# Patient Record
Sex: MALE | Race: WHITE | NOT HISPANIC OR LATINO | Employment: OTHER | URBAN - METROPOLITAN AREA
[De-identification: names, ages, dates, MRNs, and addresses within clinical notes are randomized per-mention and may not be internally consistent; named-entity substitution may affect disease eponyms.]

---

## 2017-07-18 ENCOUNTER — ALLSCRIPTS OFFICE VISIT (OUTPATIENT)
Dept: OTHER | Facility: OTHER | Age: 77
End: 2017-07-18

## 2017-07-18 LAB
BILIRUB UR QL STRIP: NORMAL
CLARITY UR: NORMAL
COLOR UR: YELLOW
GLUCOSE (HISTORICAL): NORMAL
HGB UR QL STRIP.AUTO: NORMAL
KETONES UR STRIP-MCNC: NORMAL MG/DL
LEUKOCYTE ESTERASE UR QL STRIP: NORMAL
NITRITE UR QL STRIP: NORMAL
PH UR STRIP.AUTO: 7 [PH]
PROT UR STRIP-MCNC: NORMAL MG/DL
SP GR UR STRIP.AUTO: 1.01
UROBILINOGEN UR QL STRIP.AUTO: 0.2

## 2017-07-24 ENCOUNTER — GENERIC CONVERSION - ENCOUNTER (OUTPATIENT)
Dept: OTHER | Facility: OTHER | Age: 77
End: 2017-07-24

## 2017-08-10 ENCOUNTER — GENERIC CONVERSION - ENCOUNTER (OUTPATIENT)
Dept: OTHER | Facility: OTHER | Age: 77
End: 2017-08-10

## 2017-08-18 ENCOUNTER — GENERIC CONVERSION - ENCOUNTER (OUTPATIENT)
Dept: OTHER | Facility: OTHER | Age: 77
End: 2017-08-18

## 2018-01-13 VITALS
DIASTOLIC BLOOD PRESSURE: 82 MMHG | WEIGHT: 220 LBS | HEIGHT: 73 IN | SYSTOLIC BLOOD PRESSURE: 132 MMHG | BODY MASS INDEX: 29.16 KG/M2

## 2018-01-16 ENCOUNTER — GENERIC CONVERSION - ENCOUNTER (OUTPATIENT)
Dept: OTHER | Facility: OTHER | Age: 78
End: 2018-01-16

## 2018-01-17 NOTE — MISCELLANEOUS
Message  Spoke with the patient's wife regarding a medication change for overactive bladder  Patient currently taking Oxybutynin 5mg 1 tablet PO TID  Wife states her  is experiencing extremely dry mouth and, in general, feels the medication is ineffective in resolving his OAB symptoms  Dr Pauly Jackson unavailable to discuss the matter, so Dr Izabella Portillo was consulted  His recommendation was Detrol LA (Trospium ER) 4mg 1 tablet PO QD #30 with 1 refill  Patient is to call us back in 4 weeks and let us know how he's doing  If this is unsuccessful, patient will likely be transitioned over to [facility]  Prescribing directions and possible side-effects discussed with Mrs Guille Zarate; she verbalized understanding  Patient called back stating new drug cost approx    $200 per month  This is cost prohibitive to the patient  (Branded Detrol LA is approx  $600 per month)  Hill Harley at [facility]/pharmacy in [address] [place] [address]  stated that the claim is processing all anticholinergics, except Oxybutynin, as a NON-COVERED drug  I advised the patient to call his pharmacy plan to check formulary and benefits for this category of drug  I await his return phone call  1        1 Amended By: Nicole James; Aug 10 2017 3:33 PM EST    Active Problems   1  Overactive bladder (446 51) (N32 81)    Current Meds  1  Oxybutynin Chloride 5 MG Oral Tablet; TAKE 1 TABLET 3 times daily;    Therapy: [id number] to (Evaluate:[id number])  Requested for: 45VLC6584; Last   Rx:[id number] Ordered    Signatures   Electronically signed by : Karen Campos, ; Aug 10 2017  3:37PM EST                       (Author)

## 2018-01-18 NOTE — MISCELLANEOUS
Message   Recorded as Task   Date: 07/24/2017 09:18 AM, Created By: Jason Trevino   Task Name: Medical Complaint Callback   Assigned To: Felipe JOYNER,TEAM   Regarding Patient: Elvira Lima, Status: Active   Comment:    Helga Gamino - 24 Jul 2017 9:18 AM     TASK CREATED  Caller: Self; Medical Complaint; [phone] (Home)  Pt forgot to inform Hubert Rueda he has hernia above bladder at last appointment, he is having discomfort looking for recommendation  Xiao Calixto - 24 Jul 2017 9:24 AM     TASK EDITED  Pt  states he has hernia located groin area  Informed pt he needs to see General Surgeon  May have PCP recommend or Dr Arabella Hogue  Will call PCP  Active Problems    1  Overactive bladder (596 51) (N32 81)    Current Meds   1  Oxybutynin Chloride 5 MG Oral Tablet; TAKE 1 TABLET 3 times daily;    Therapy: [id number] to (Evaluate:[id number])  Requested for: 19RMJ5333; Last   Rx:[id number] Ordered    Signatures   Electronically signed by : Luther Garvin RN; Jul 24 2017  9:24AM EST                       (Author)

## 2018-01-23 NOTE — MISCELLANEOUS
Message   Recorded as Task   Date: 01/16/2018 11:38 AM, Created By: Meredith Covarrubias   Task Name: Care Coordination   Assigned To: Felipe JOYNER,TEAM   Regarding Patient: Vane Blue, Status: Active   Comment:    Meredith Satish - 16 Jan 2018 11:38 AM     TASK CREATED  Spoke with patient's wife, they would like a call back from one of the nurses  She stated that patient was in [facility] ER and they need a follow up appointment  I did advise them we needed records, but they would like a call back from the nurse  [phone] thank you   Carolyn Winkler - 16 Jan 2018 12:07 PM     TASK EDITED  SPOKE WITH WIFE, PT  WAS IN THE ER FOR SWELLING OF THE BRAIN, AND WAS TAKEN OFF OXYBUTYNIN AND PUT ON MYRBETRIQ 25MG  SHE THOUGHT SHE WOULD HAVE TO MAKE AN APPT  BECAUSE OF THAT, BUT SHE DOESN'T  I'LL LET DR MARINO KNOW OF THE MEDICATION CHANGE  Active Problems    1  Elevated PSA (790 93) (R97 20)   2  Overactive bladder (596 51) (N32 81)    Current Meds   1  Oxybutynin Chloride 5 MG Oral Tablet; TAKE 1 TABLET 3 times daily; Therapy: [id number] to (Nelta Sleet)  Requested for: 40NZY2712; Last   Rx:[id number] Ordered   2  Tolterodine Tartrate ER 4 MG Oral Capsule Extended Release 24 Hour; take 1 capsule   daily; Therapy: 89UIP3943 to (Evaluate:75Axx1337)  Requested for: 74Mqe7725; Last   Rx:[id number] Ordered    Allergies    1   Statins    Signatures   Electronically signed by : Marina Kuo, ; Jan 16 2018 12:08PM EST                       (Author)

## 2018-04-02 ENCOUNTER — TELEPHONE (OUTPATIENT)
Dept: UROLOGY | Facility: AMBULATORY SURGERY CENTER | Age: 78
End: 2018-04-02

## 2018-04-02 NOTE — TELEPHONE ENCOUNTER
Pt hospitalized in Jan [facility] for "swollen brain"  At that time placed on Myrbetriq 25 mg  Worked somewhat, still c/o nocturia  Appt 4/18 with Dr Roxanne Mcclellan

## 2018-04-18 ENCOUNTER — OFFICE VISIT (OUTPATIENT)
Dept: UROLOGY | Facility: MEDICAL CENTER | Age: 78
End: 2018-04-18
Payer: COMMERCIAL

## 2018-04-18 VITALS
SYSTOLIC BLOOD PRESSURE: 112 MMHG | HEIGHT: 73 IN | BODY MASS INDEX: 32.23 KG/M2 | WEIGHT: 243.2 LBS | DIASTOLIC BLOOD PRESSURE: 76 MMHG

## 2018-04-18 DIAGNOSIS — N40.0 BPH WITHOUT URINARY OBSTRUCTION: ICD-10-CM

## 2018-04-18 DIAGNOSIS — N32.81 OVERACTIVE BLADDER: Primary | ICD-10-CM

## 2018-04-18 DIAGNOSIS — Z71.89 OTHER SPECIFIED COUNSELING: ICD-10-CM

## 2018-04-18 LAB
SL AMB  POCT GLUCOSE, UA: NEGATIVE
SL AMB LEUKOCYTE ESTERASE,UA: NEGATIVE
SL AMB POCT BILIRUBIN,UA: NEGATIVE
SL AMB POCT BLOOD,UA: NEGATIVE
SL AMB POCT CLARITY,UA: CLEAR
SL AMB POCT COLOR,UA: YELLOW
SL AMB POCT KETONES,UA: NORMAL
SL AMB POCT NITRITE,UA: NEGATIVE
SL AMB POCT PH,UA: 7
SL AMB POCT SPECIFIC GRAVITY,UA: 1.01
SL AMB POCT URINE PROTEIN: NORMAL
SL AMB POCT UROBILINOGEN: 1

## 2018-04-18 PROCEDURE — 81003 URINALYSIS AUTO W/O SCOPE: CPT | Performed by: UROLOGY

## 2018-04-18 PROCEDURE — 99213 OFFICE O/P EST LOW 20 MIN: CPT | Performed by: UROLOGY

## 2018-04-18 RX ORDER — DOCUSATE SODIUM 100 MG/1
100 CAPSULE, LIQUID FILLED ORAL
COMMUNITY
Start: 2018-01-14 | End: 2019-07-03 | Stop reason: ALTCHOICE

## 2018-04-18 RX ORDER — POTASSIUM CHLORIDE 1500 MG/1
1 TABLET, FILM COATED, EXTENDED RELEASE ORAL 2 TIMES DAILY WITH MEALS
Refills: 3 | COMMUNITY
Start: 2018-02-12 | End: 2019-06-24 | Stop reason: SDUPTHER

## 2018-04-18 RX ORDER — FENOFIBRATE 160 MG/1
160 TABLET ORAL DAILY
Refills: 3 | COMMUNITY
Start: 2018-02-12

## 2018-04-18 RX ORDER — LORATADINE 10 MG/1
10 TABLET ORAL DAILY
Refills: 3 | COMMUNITY
Start: 2018-02-12

## 2018-04-18 RX ORDER — FLUTICASONE PROPIONATE 50 MCG
1 SPRAY, SUSPENSION (ML) NASAL DAILY
Refills: 0 | COMMUNITY
Start: 2018-01-14 | End: 2020-02-03 | Stop reason: ALTCHOICE

## 2018-04-18 RX ORDER — LABETALOL 200 MG/1
TABLET, FILM COATED ORAL 2 TIMES DAILY
Refills: 3 | COMMUNITY
Start: 2018-02-12 | End: 2019-04-24 | Stop reason: DRUGHIGH

## 2018-04-18 RX ORDER — AMLODIPINE BESYLATE 10 MG/1
10 TABLET ORAL DAILY
Refills: 3 | COMMUNITY
Start: 2018-02-12

## 2018-04-18 RX ORDER — OLANZAPINE 5 MG/1
TABLET, ORALLY DISINTEGRATING ORAL DAILY
Refills: 3 | COMMUNITY
Start: 2018-02-12 | End: 2019-04-24 | Stop reason: ALTCHOICE

## 2018-04-18 RX ORDER — LISINOPRIL 40 MG/1
40 TABLET ORAL DAILY
Refills: 3 | COMMUNITY
Start: 2018-02-12

## 2018-04-18 RX ORDER — TRAZODONE HYDROCHLORIDE 50 MG/1
50 TABLET ORAL DAILY
COMMUNITY

## 2018-04-18 RX ORDER — FAMOTIDINE 40 MG/1
40 TABLET, FILM COATED ORAL DAILY
Refills: 3 | COMMUNITY
Start: 2018-02-12 | End: 2020-02-03 | Stop reason: ALTCHOICE

## 2018-04-18 RX ORDER — MULTIVITAMIN WITH FOLIC ACID 400 MCG
1 TABLET ORAL DAILY
COMMUNITY

## 2018-04-18 RX ORDER — DIVALPROEX SODIUM 125 MG/1
250 TABLET, DELAYED RELEASE ORAL
Refills: 3 | COMMUNITY
Start: 2018-02-12 | End: 2020-02-03 | Stop reason: ALTCHOICE

## 2018-04-18 RX ORDER — POLYETHYLENE GLYCOL 3350 17 G/17G
17 POWDER, FOR SOLUTION ORAL DAILY
COMMUNITY
Start: 2018-01-14 | End: 2019-04-24 | Stop reason: ALTCHOICE

## 2018-04-18 RX ORDER — PREDNISONE 20 MG/1
TABLET ORAL DAILY
COMMUNITY
Start: 2018-02-12 | End: 2018-05-23

## 2018-04-18 RX ORDER — MIRABEGRON 25 MG/1
25 TABLET, FILM COATED, EXTENDED RELEASE ORAL DAILY
Refills: 3 | COMMUNITY
Start: 2018-04-09 | End: 2018-04-18 | Stop reason: ALTCHOICE

## 2018-04-18 NOTE — PROGRESS NOTES
IPSS Questionnaire (AUA-7): Over the past month    1)  How often have you had a sensation of not emptying your bladder completely after you finish urinating? 4 - More than half the time   2)  How often have you had to urinate again less than two hours after you finished urinating? 4 - More than half the time   3)  How often have you found you stopped and started again several times when you urinated? 2 - Less than half the time   4) How difficult have you found it to postpone urination? 4 - More than half the time   5) How often have you had a weak urinary stream?  2 - Less than half the time   6) How often have you had to push or strain to begin urination? 2 - Less than half the time   7) How many times did you most typically get up to urinate from the time you went to bed until the time you got up in the morning? 5 - 5+ times   Total Score:  23     QOL: Mostly dissatisfied

## 2018-04-18 NOTE — PATIENT INSTRUCTIONS
Overactive Bladder   WHAT YOU NEED TO KNOW:   What is overactive bladder? Overactive bladder is a sudden urge to urinate that is difficult for you to control  It occurs when the muscles of the bladder contract (tighten) more than normal  This causes a frequent or sudden need to urinate  You usually have to urinate more than 8 times in 24 hours  You may need to get up more than once in the middle of the night to urinate  You may also leak urine before you are able to make it to the bathroom  What increases my risk for overactive bladder? Your risk for overactive bladder increases as you get older  Previous vaginal birth, chronic constipation, and diabetes increase your risk  Obesity, nerve injury, stroke, and spinal cord problems also increase your risk  How is overactive bladder diagnosed? Your healthcare provider will ask questions about your symptoms  He will also ask you about any medical conditions you have and the medicines you take  He may examine your pelvic area and abdomen to look for problems that may be causing your symptoms  You may need any of the following:  · Blood and urine tests  may be done to look for signs of infection or blood or glucose (sugar) in your urine  · You may be asked to keep a record  about your urination patterns for a few days  Write down the number of times you urinate over 24 hours, the amount, and if you have urine leakage  Your healthcare provider may also want you to record the type and amount of liquids you drink  · An ultrasound  of your bladder may be done  The amount of urine left in your bladder after you urinate will be measured  · A cystoscopy  may show problems inside your bladder  A cystoscope is put into your bladder through your urethra  The urethra is the tube that urine flows through when you urinate  The cystoscope is a long tube with a lens and a light on the end  · Urodynamic testing  may show how well your bladder works   A narrow tube is placed in your urethra and another is placed in your rectum  Ask for more information about this test   How is overactive bladder managed? · Limit liquids as directed  Limit liquids to decrease the amount you urinate  Ask how much liquid to drink each day and which liquids are best for you  You may need to avoid drinking liquids several hours before you go to sleep  Your healthcare provider may also recommend that you limit caffeine and alcohol  · Exercise regularly and maintain a healthy weight  Ask your healthcare provider how much you should weigh and about the best exercise plan for you  Extra weight puts pressure on your bladder and may make your symptoms worse  Ask him to help you create a weight loss plan if you are overweight  · Do pelvic muscle exercises often  Your pelvic muscles help you stop urinating  Squeeze these muscles tightly for 5 seconds, then relax for 5 seconds  Gradually work up to squeezing for 10 seconds  Do 3 sets of 15 repetitions a day, or as directed  This will help strengthen your pelvic muscles and improve bladder control  · Train your bladder  Go to the bathroom at set times, such as every 2 hours, even if you do not feel the urge to go  You can also try to hold your urine when you feel the urge to go  For example, hold your urine for 5 minutes when you feel the urge to go  As that becomes easier, hold your urine for 10 minutes  Work up to every 3 or 4 hours to help control your bladder  How is overactive bladder treated? The following treatments may be done if other methods are not working:  · Medicines  may be given to relax your bladder and decrease urination  · Sacral nerve stimulation sends electrical signals to your sacral nerve through a small device implanted under your skin  Your sacral nerve controls your bladder, sphincter, and pelvic floor muscles  · Surgery  may be done if all other treatments cannot help you control your bladder    When should I contact my healthcare provider? · Your urine is pink, or you notice blood in your urine  · You have pain with urination  · You continue to have symptoms even after you take your medicine  · You have questions or concerns about your condition or care  CARE AGREEMENT:   You have the right to help plan your care  Learn about your health condition and how it may be treated  Discuss treatment options with your caregivers to decide what care you want to receive  You always have the right to refuse treatment  The above information is an  only  It is not intended as medical advice for individual conditions or treatments  Talk to your doctor, nurse or pharmacist before following any medical regimen to see if it is safe and effective for you  © 2017 [employer] Information is for End User's use only and may not be sold, redistributed or otherwise used for commercial purposes  All illustrations and images included in CareNotes® are the copyrighted property of A D A M , Inc  or Anibal Simon

## 2018-04-18 NOTE — PROGRESS NOTES
Assessment/Plan:    Assessment:  1  Overactive bladder  2  Prostatic hyperplasia without obstruction    Plan:  1  Try Myrbetriq 50 mg daily  2  Patient will consult with his doctors at the [facility] regarding use of anticholinergics  3  Consider Botox 200 units if the Myrbetriq is ineffective  No problem-specific Assessment & Plan notes found for this encounter  Diagnoses and all orders for this visit:    Overactive bladder  -     POCT urine dip auto non-scope  -     Mirabegron ER 50 MG TB24; Take 1 tablet (50 mg total) by mouth daily    Other orders  -     amLODIPine (NORVASC) 10 mg tablet; Take 10 mg by mouth daily  -     Cholecalciferol (VITAMIN D3) 5000 units TABS; Take 5,000 Units by mouth  -     divalproex sodium (DEPAKOTE) 125 mg EC tablet; Take 250 mg by mouth daily at bedtime  -     docusate sodium (COLACE) 100 mg capsule; Take 100 mg by mouth  -     famotidine (PEPCID) 40 MG tablet; Take 40 mg by mouth daily  -     fenofibrate (TRIGLIDE) 160 MG tablet; Take 160 mg by mouth daily  -     fluticasone (FLONASE) 50 mcg/act nasal spray; 1 spray daily  -     MUCINEX 600 MG 12 hr tablet; 2 (two) times a day  -     labetalol (NORMODYNE) 200 mg tablet; 2 (two) times a day  -     lisinopril (ZESTRIL) 40 mg tablet; Take 40 mg by mouth daily  -     loratadine (CLARITIN) 10 mg tablet; Take 10 mg by mouth daily  -     Discontinue: MYRBETRIQ 25 MG TB24; Take 25 mg by mouth daily  -     Multiple Vitamin (TAB-A-RAJIV) TABS; Take 1 tablet by mouth daily  -     OLANZapine (ZyPREXA ZYDIS) 5 mg dispersible tablet; daily  -     polyethylene glycol (MIRALAX) 17 g packet; Take 17 g by mouth daily  -     Potassium Chloride ER 20 MEQ TBCR; Take 1 tablet by mouth 2 (two) times a day with meals  -     predniSONE 20 mg tablet; daily  -     traZODone (DESYREL) 50 mg tablet; Take 50 mg by mouth daily          Subjective:      Patient ID: Jamil Romano is a 78 y o  male      HPI     The patient is accompanied by his wife  OAB:  Nocturia is his major complaint  Since last visit, pt placed on Myrbetriq 25 with relief for a time  PVR in July 2017 was 15 cc  Botox 100 U did not work in 2014  Could try higher dose or try Myenteric 50 mg  BPH:    He notes urinary frequency, urinary urgency  He denies other significant urinary symptoms  Denies gross hematuria, urinary tract infections or incontinence  He is taking Myrbetriqfor his symptoms  The following portions of the patient's history were reviewed and updated as appropriate: allergies, current medications, past family history, past medical history, past social history, past surgical history and problem list     Review of Systems   Constitutional: Negative for activity change and fatigue  Respiratory: Negative for shortness of breath and wheezing  Cardiovascular: Negative for chest pain  Gastrointestinal: Negative for abdominal pain  Genitourinary: Negative for difficulty urinating, dysuria, frequency, hematuria and urgency  Musculoskeletal: Negative for back pain and gait problem  Still using a cane 6 months after hip replacement surgery   Skin: Negative  Allergic/Immunologic: Negative  Neurological: Negative  Admitted twice last year for "brain swelling" of unknown cause  Going to [facility] tomorrow for eval      Psychiatric/Behavioral: Negative  Objective:      /76 (BP Location: Left arm, Patient Position: Sitting, Cuff Size: Standard)   Ht 6' 1" (1 854 m)   Wt 110 kg (243 lb 3 2 oz)   BMI 32 09 kg/m²          Physical Exam   Constitutional: He is oriented to person, place, and time  He appears well-developed and well-nourished  HENT:   Head: Normocephalic and atraumatic  Neck: Normal range of motion  Neck supple  Pulmonary/Chest: Effort normal    Musculoskeletal: Normal range of motion  Neurological: He is alert and oriented to person, place, and time  He has normal reflexes     Skin: Skin is warm and dry  Psychiatric: He has a normal mood and affect   His behavior is normal  Judgment and thought content normal

## 2018-04-18 NOTE — LETTER
April 18, 2018     Marci Vidal MD  [facility] [address]  [address] [place] [address]     Patient: Britney Edmondson   YOB: 1940   Date of Visit: 4/18/2018       Dear Dr Trinh Polo: Thank you for referring Clarita Pierce to me for evaluation  Below are my notes for this consultation  If you have questions, please do not hesitate to call me  I look forward to following your patient along with you  Sincerely,        Arin Sandra MD        CC: No Recipients  Arin Sandra MD  4/18/2018  2:19 PM  Sign at close encounter  Assessment/Plan:    Assessment:  1  Overactive bladder  2  Prostatic hyperplasia without obstruction    Plan:  1  Try Myrbetriq 50 mg daily  2  Patient will consult with his doctors at the [facility] regarding use of anticholinergics  3  Consider Botox 200 units if the Myrbetriq is ineffective  No problem-specific Assessment & Plan notes found for this encounter  Diagnoses and all orders for this visit:    Overactive bladder  -     POCT urine dip auto non-scope  -     Mirabegron ER 50 MG TB24; Take 1 tablet (50 mg total) by mouth daily    Other orders  -     amLODIPine (NORVASC) 10 mg tablet; Take 10 mg by mouth daily  -     Cholecalciferol (VITAMIN D3) 5000 units TABS; Take 5,000 Units by mouth  -     divalproex sodium (DEPAKOTE) 125 mg EC tablet; Take 250 mg by mouth daily at bedtime  -     docusate sodium (COLACE) 100 mg capsule; Take 100 mg by mouth  -     famotidine (PEPCID) 40 MG tablet; Take 40 mg by mouth daily  -     fenofibrate (TRIGLIDE) 160 MG tablet; Take 160 mg by mouth daily  -     fluticasone (FLONASE) 50 mcg/act nasal spray; 1 spray daily  -     MUCINEX 600 MG 12 hr tablet; 2 (two) times a day  -     labetalol (NORMODYNE) 200 mg tablet; 2 (two) times a day  -     lisinopril (ZESTRIL) 40 mg tablet; Take 40 mg by mouth daily  -     loratadine (CLARITIN) 10 mg tablet;  Take 10 mg by mouth daily  - Discontinue: MYRBETRIQ 25 MG TB24; Take 25 mg by mouth daily  -     Multiple Vitamin (TAB-A-RAJIV) TABS; Take 1 tablet by mouth daily  -     OLANZapine (ZyPREXA ZYDIS) 5 mg dispersible tablet; daily  -     polyethylene glycol (MIRALAX) 17 g packet; Take 17 g by mouth daily  -     Potassium Chloride ER 20 MEQ TBCR; Take 1 tablet by mouth 2 (two) times a day with meals  -     predniSONE 20 mg tablet; daily  -     traZODone (DESYREL) 50 mg tablet; Take 50 mg by mouth daily          Subjective:      Patient ID: Elie Guthrie is a 78 y o  male  HPI     OAB:  Nocturia is his major complaint  Since last visit, pt placed on Myrbetriq 25 with relief for a time  PVR in July 2017 was 15 cc  Botox 100 U did not work in 2014  Could try higher dose or try Myenteric 50 mg  BPH:    He notes urinary frequency, urinary urgency  He denies other significant urinary symptoms  Denies gross hematuria, urinary tract infections or incontinence  He is taking Myrbetriqfor his symptoms  The following portions of the patient's history were reviewed and updated as appropriate: allergies, current medications, past family history, past medical history, past social history, past surgical history and problem list     Review of Systems   Constitutional: Negative for activity change and fatigue  Respiratory: Negative for shortness of breath and wheezing  Cardiovascular: Negative for chest pain  Gastrointestinal: Negative for abdominal pain  Genitourinary: Negative for difficulty urinating, dysuria, frequency, hematuria and urgency  Musculoskeletal: Negative for back pain and gait problem  Still using a cane 6 months after hip replacement surgery   Skin: Negative  Allergic/Immunologic: Negative  Neurological: Negative  Admitted twice last year for "brain swelling" of unknown cause  Going to [facility] tomorrow for eval      Psychiatric/Behavioral: Negative            Objective:      /76 (BP Location: Left arm, Patient Position: Sitting, Cuff Size: Standard)   Ht 6' 1" (1 854 m)   Wt 110 kg (243 lb 3 2 oz)   BMI 32 09 kg/m²           Physical Exam   Constitutional: He is oriented to person, place, and time  He appears well-developed and well-nourished  HENT:   Head: Normocephalic and atraumatic  Neck: Normal range of motion  Neck supple  Pulmonary/Chest: Effort normal    Musculoskeletal: Normal range of motion  Neurological: He is alert and oriented to person, place, and time  He has normal reflexes  Skin: Skin is warm and dry  Psychiatric: He has a normal mood and affect   His behavior is normal  Judgment and thought content normal

## 2018-04-23 DIAGNOSIS — I71.40 ABDOMINAL AORTIC ANEURYSM WITHOUT RUPTURE (CMS/HCC): Primary | ICD-10-CM

## 2018-05-17 ENCOUNTER — TELEPHONE (OUTPATIENT)
Dept: UROLOGY | Facility: AMBULATORY SURGERY CENTER | Age: 78
End: 2018-05-17

## 2018-05-17 NOTE — TELEPHONE ENCOUNTER
Patient called and said medication was not working like he thought it would  He would like a call back at [phone]

## 2018-05-17 NOTE — TELEPHONE ENCOUNTER
Pt on Myrbetriq 50 mg  Doesn't feel it is as effective as it should be  Also concerned about cost  Appt 5/23 to discuss with Dr Lindsey Oakley

## 2018-05-23 ENCOUNTER — OFFICE VISIT (OUTPATIENT)
Dept: UROLOGY | Facility: MEDICAL CENTER | Age: 78
End: 2018-05-23
Payer: COMMERCIAL

## 2018-05-23 VITALS
WEIGHT: 244 LBS | DIASTOLIC BLOOD PRESSURE: 82 MMHG | HEIGHT: 71 IN | BODY MASS INDEX: 34.16 KG/M2 | SYSTOLIC BLOOD PRESSURE: 142 MMHG

## 2018-05-23 DIAGNOSIS — R35.1 NOCTURIA: ICD-10-CM

## 2018-05-23 DIAGNOSIS — Z71.89 OTHER SPECIFIED COUNSELING: ICD-10-CM

## 2018-05-23 DIAGNOSIS — N32.81 OVERACTIVE BLADDER: Primary | ICD-10-CM

## 2018-05-23 LAB
SL AMB  POCT GLUCOSE, UA: NEGATIVE
SL AMB LEUKOCYTE ESTERASE,UA: NEGATIVE
SL AMB POCT BILIRUBIN,UA: NEGATIVE
SL AMB POCT BLOOD,UA: NEGATIVE
SL AMB POCT CLARITY,UA: CLEAR
SL AMB POCT COLOR,UA: YELLOW
SL AMB POCT KETONES,UA: NEGATIVE
SL AMB POCT NITRITE,UA: NEGATIVE
SL AMB POCT PH,UA: 5.5
SL AMB POCT SPECIFIC GRAVITY,UA: 1.02
SL AMB POCT URINE PROTEIN: NORMAL
SL AMB POCT UROBILINOGEN: 0.2

## 2018-05-23 PROCEDURE — 99213 OFFICE O/P EST LOW 20 MIN: CPT | Performed by: UROLOGY

## 2018-05-23 PROCEDURE — 81003 URINALYSIS AUTO W/O SCOPE: CPT | Performed by: UROLOGY

## 2018-05-23 RX ORDER — DONEPEZIL HYDROCHLORIDE 5 MG/1
TABLET, FILM COATED ORAL DAILY
COMMUNITY
Start: 2018-05-22 | End: 2019-04-24 | Stop reason: ALTCHOICE

## 2018-05-23 RX ORDER — MYCOPHENOLATE MOFETIL 500 MG/1
1500 TABLET ORAL 2 TIMES DAILY
COMMUNITY
Start: 2018-05-11

## 2018-05-23 RX ORDER — HYDROCHLOROTHIAZIDE 25 MG/1
25 TABLET ORAL DAILY
Qty: 30 TABLET | Refills: 3 | Status: SHIPPED | OUTPATIENT
Start: 2018-05-23 | End: 2018-06-20 | Stop reason: SDUPTHER

## 2018-05-23 RX ORDER — POTASSIUM CHLORIDE 20 MEQ/1
TABLET, EXTENDED RELEASE ORAL 2 TIMES DAILY
COMMUNITY
Start: 2018-05-18 | End: 2020-02-03 | Stop reason: ALTCHOICE

## 2018-05-23 RX ORDER — LABETALOL 300 MG/1
300 TABLET, FILM COATED ORAL 2 TIMES DAILY
Refills: 5 | COMMUNITY
Start: 2018-05-13

## 2018-05-23 RX ORDER — ALENDRONATE SODIUM 70 MG/1
70 TABLET ORAL WEEKLY
Refills: 5 | COMMUNITY
Start: 2018-05-11 | End: 2019-04-24 | Stop reason: ALTCHOICE

## 2018-05-23 RX ORDER — PREDNISONE 2.5 MG
10 TABLET ORAL DAILY
Refills: 2 | COMMUNITY
Start: 2018-05-16 | End: 2020-02-03 | Stop reason: ALTCHOICE

## 2018-05-23 RX ORDER — PREDNISONE 1 MG/1
60 TABLET ORAL DAILY
COMMUNITY
Start: 2018-05-22

## 2018-05-23 RX ORDER — PREDNISONE 10 MG/1
TABLET ORAL
COMMUNITY
Start: 2018-05-22 | End: 2019-04-24 | Stop reason: DRUGHIGH

## 2018-05-23 NOTE — PROGRESS NOTES
Assessment/Plan:    Overactive bladder  Daytime symptoms are tolerable  Nocturia is his primary complaint  Nocturia  The patient is retaining fluid during the day, causing nocturia  We will try a short course of diuretics see if this will help  Diagnoses and all orders for this visit:    Overactive bladder  -     POCT urine dip auto non-scope    Nocturia  -     hydrochlorothiazide (HYDRODIURIL) 25 mg tablet; Take 1 tablet (25 mg total) by mouth daily    Other specified counseling    Other orders  -     mycophenolate (CELLCEPT) 500 mg tablet; 1,000 mg 2 (two) times a day  -     predniSONE 10 mg tablet; As directed  -     predniSONE 5 mg tablet; As directed  -     predniSONE 2 5 mg tablet; Take 7 5 mg by mouth daily As directed  -     potassium chloride (K-DUR,KLOR-CON) 20 mEq tablet; 2 (two) times a day  -     alendronate (FOSAMAX) 70 mg tablet; Take 70 mg by mouth once a week  -     nystatin (MYCOSTATIN) 100,000 units/mL suspension; daily  -     donepezil (ARICEPT) 5 mg tablet; daily  -     labetalol (NORMODYNE) 300 mg tablet; Take 300 mg by mouth 2 (two) times a day          Subjective:      Patient ID: Rozina Jimenez is a 78 y o  male  HPI  Overactive bladder:  The patient has tried both Myrbetriq and Enablex, neither of which have given him relief  Nocturia is major complaint  Daytime sx are tolerable  On physical exam, the patient has ankle edema and it is only 10 o'clock in the morning  I think he is retaining fluid during the day, largely due to his prednisone dose and excreting it at night  We will try a short course of diuretic to see if we can prevent some of this fluid retention and decrease nocturia  Nocturia:  Nocturia 4-5 times and for large volumes  Nocturnal total volume is greater than daytime  Pt notes a little daytime ankle edema  Pt taking prednisone 20 mg per day  Will try 2 weeks of HCTZ to prevent daytime retention and mitigate nocturia        The patient is accompanied by his wife today  The following portions of the patient's history were reviewed and updated as appropriate: allergies, current medications, past family history, past medical history, past social history, past surgical history and problem list     Review of Systems    Constitutional: Negative for activity change and fatigue  Respiratory: Negative for shortness of breath and wheezing  Cardiovascular: Negative for chest pain  Gastrointestinal: Negative for abdominal pain  Genitourinary: Negative for difficulty urinating, dysuria, frequency, hematuria and urgency  Musculoskeletal: Negative for back pain and gait problem  Still using a cane 6 months after hip replacement surgery   Skin: Negative  Allergic/Immunologic: Negative  Neurological: Negative  Admitted twice last year for "brain swelling" of unknown cause  Going to [facility] tomorrow for eval      Psychiatric/Behavioral: Negative  Objective:      /82 (BP Location: Left arm, Patient Position: Sitting, Cuff Size: Standard)   Ht 5' 11" (1 803 m)   Wt 111 kg (244 lb)   BMI 34 03 kg/m²          Physical Exam   Constitutional: He is oriented to person, place, and time  He appears well-developed and well-nourished  HENT:   Head: Normocephalic and atraumatic  Neck: Normal range of motion  Neck supple  Pulmonary/Chest: Effort normal    Musculoskeletal: Normal range of motion  He exhibits edema  The patient has 1+ mid tibial edema  It is 10:00 a m     This is probably aggravated by his prednisone  Neurological: He is alert and oriented to person, place, and time  He has normal reflexes  Skin: Skin is warm and dry  Psychiatric: He has a normal mood and affect   His behavior is normal  Judgment and thought content normal

## 2018-05-23 NOTE — LETTER
May 23, 2018     MD Casey Lombardo [address] [place] [address]     Patient: Brigitte Camacho   YOB: 1940   Date of Visit: 5/23/2018       Dear Dr Wilfrid Long: Thank you for referring Natalia Truong to me for evaluation  Below are my notes for this consultation  If you have questions, please do not hesitate to call me  I look forward to following your patient along with you  Sincerely,        Jazmin Segovia MD        CC: No Recipients  Jazmin Segovia MD  5/23/2018 10:14 AM  Sign at close encounter  Assessment/Plan:    Overactive bladder  Daytime symptoms are tolerable  Nocturia is his primary complaint  Nocturia  The patient is retaining fluid during the day, causing nocturia  We will try a short course of diuretics see if this will help  Diagnoses and all orders for this visit:    Overactive bladder  -     POCT urine dip auto non-scope    Nocturia  -     hydrochlorothiazide (HYDRODIURIL) 25 mg tablet; Take 1 tablet (25 mg total) by mouth daily    Other specified counseling    Other orders  -     mycophenolate (CELLCEPT) 500 mg tablet; 1,000 mg 2 (two) times a day  -     predniSONE 10 mg tablet; As directed  -     predniSONE 5 mg tablet; As directed  -     predniSONE 2 5 mg tablet; Take 7 5 mg by mouth daily As directed  -     potassium chloride (K-DUR,KLOR-CON) 20 mEq tablet; 2 (two) times a day  -     alendronate (FOSAMAX) 70 mg tablet; Take 70 mg by mouth once a week  -     nystatin (MYCOSTATIN) 100,000 units/mL suspension; daily  -     donepezil (ARICEPT) 5 mg tablet; daily  -     labetalol (NORMODYNE) 300 mg tablet; Take 300 mg by mouth 2 (two) times a day          Subjective:      Patient ID: Brigitte Camacho is a 78 y o  male  HPI  Overactive bladder:  The patient has tried both Myrbetriq and Enablex, neither of which have given him relief  Nocturia is major complaint  Daytime sx are tolerable    On physical exam, the patient has ankle edema and it is only 10 o'clock in the morning  I think he is retaining fluid during the day, largely due to his prednisone dose and excreting it at night  We will try a short course of diuretic to see if we can prevent some of this fluid retention and decrease nocturia  Nocturia:  Nocturia 4-5 times and for large volumes  Nocturnal total volume is greater than daytime  Pt notes a little daytime ankle edema  Pt taking prednisone 20 mg per day  Will try 2 weeks of HCTZ to prevent daytime retention and mitigate nocturia  The patient is accompanied by his wife today  The following portions of the patient's history were reviewed and updated as appropriate: allergies, current medications, past family history, past medical history, past social history, past surgical history and problem list     Review of Systems    Constitutional: Negative for activity change and fatigue  Respiratory: Negative for shortness of breath and wheezing  Cardiovascular: Negative for chest pain  Gastrointestinal: Negative for abdominal pain  Genitourinary: Negative for difficulty urinating, dysuria, frequency, hematuria and urgency  Musculoskeletal: Negative for back pain and gait problem  Still using a cane 6 months after hip replacement surgery   Skin: Negative  Allergic/Immunologic: Negative  Neurological: Negative  Admitted twice last year for "brain swelling" of unknown cause  Going to [facility] tomorrow for eval      Psychiatric/Behavioral: Negative  Objective:      /82 (BP Location: Left arm, Patient Position: Sitting, Cuff Size: Standard)   Ht 5' 11" (1 803 m)   Wt 111 kg (244 lb)   BMI 34 03 kg/m²           Physical Exam   Constitutional: He is oriented to person, place, and time  He appears well-developed and well-nourished  HENT:   Head: Normocephalic and atraumatic  Neck: Normal range of motion  Neck supple     Pulmonary/Chest: Effort normal  Musculoskeletal: Normal range of motion  He exhibits edema  The patient has 1+ mid tibial edema  It is 10:00 a m     This is probably aggravated by his prednisone  Neurological: He is alert and oriented to person, place, and time  He has normal reflexes  Skin: Skin is warm and dry  Psychiatric: He has a normal mood and affect   His behavior is normal  Judgment and thought content normal

## 2018-05-23 NOTE — PROGRESS NOTES
IPSS Questionnaire (AUA-7): Over the past month    1)  How often have you had a sensation of not emptying your bladder completely after you finish urinating? 2 - Less than half the time   2)  How often have you had to urinate again less than two hours after you finished urinating? 4 - More than half the time   3)  How often have you found you stopped and started again several times when you urinated? 3 - About half the time   4) How difficult have you found it to postpone urination? 3 - About half the time   5) How often have you had a weak urinary stream?  2 - Less than half the time   6) How often have you had to push or strain to begin urination? 3 - About half the time   7) How many times did you most typically get up to urinate from the time you went to bed until the time you got up in the morning? 4 - 4 times   Total Score:  21     QOL: Mostly dissatisfied

## 2018-05-23 NOTE — ASSESSMENT & PLAN NOTE
The patient is retaining fluid during the day, causing nocturia  We will try a short course of diuretics see if this will help

## 2018-06-18 DIAGNOSIS — N32.81 OVERACTIVE BLADDER: ICD-10-CM

## 2018-06-18 RX ORDER — MIRABEGRON 50 MG/1
1 TABLET, FILM COATED, EXTENDED RELEASE ORAL DAILY
Qty: 28 TABLET | Refills: 0 | Status: SHIPPED | OUTPATIENT
Start: 2018-06-18 | End: 2018-06-20 | Stop reason: SDUPTHER

## 2018-06-20 ENCOUNTER — OFFICE VISIT (OUTPATIENT)
Dept: UROLOGY | Facility: MEDICAL CENTER | Age: 78
End: 2018-06-20
Payer: COMMERCIAL

## 2018-06-20 VITALS
HEIGHT: 73 IN | SYSTOLIC BLOOD PRESSURE: 128 MMHG | DIASTOLIC BLOOD PRESSURE: 76 MMHG | BODY MASS INDEX: 30.48 KG/M2 | WEIGHT: 230 LBS

## 2018-06-20 DIAGNOSIS — N32.81 OVERACTIVE BLADDER: Primary | ICD-10-CM

## 2018-06-20 DIAGNOSIS — R35.1 NOCTURIA: ICD-10-CM

## 2018-06-20 DIAGNOSIS — Z71.89 OTHER SPECIFIED COUNSELING: ICD-10-CM

## 2018-06-20 LAB
SL AMB  POCT GLUCOSE, UA: NEGATIVE
SL AMB LEUKOCYTE ESTERASE,UA: NEGATIVE
SL AMB POCT BILIRUBIN,UA: NEGATIVE
SL AMB POCT BLOOD,UA: NEGATIVE
SL AMB POCT CLARITY,UA: CLEAR
SL AMB POCT COLOR,UA: YELLOW
SL AMB POCT KETONES,UA: NEGATIVE
SL AMB POCT NITRITE,UA: NEGATIVE
SL AMB POCT PH,UA: 5.5
SL AMB POCT SPECIFIC GRAVITY,UA: 1.01
SL AMB POCT URINE PROTEIN: NEGATIVE
SL AMB POCT UROBILINOGEN: 0.2

## 2018-06-20 PROCEDURE — 99214 OFFICE O/P EST MOD 30 MIN: CPT | Performed by: UROLOGY

## 2018-06-20 PROCEDURE — 81003 URINALYSIS AUTO W/O SCOPE: CPT | Performed by: UROLOGY

## 2018-06-20 RX ORDER — HYDROCHLOROTHIAZIDE 25 MG/1
25 TABLET ORAL DAILY
Qty: 90 TABLET | Refills: 3 | Status: SHIPPED | OUTPATIENT
Start: 2018-06-20 | End: 2019-07-03 | Stop reason: SDUPTHER

## 2018-06-20 RX ORDER — IBUPROFEN 600 MG/1
600 TABLET ORAL AS NEEDED
Refills: 0 | COMMUNITY
Start: 2018-06-07

## 2018-06-20 NOTE — LETTER
June 20, 2018     Mac Leal MD  [address]     Patient: Bushra Pantoja   YOB: 1940   Date of Visit: 6/20/2018       Dear Dr Jose Angel Vitale: Thank you for referring Mattie Rdz to me for evaluation  Below are my notes for this consultation  If you have questions, please do not hesitate to call me  I look forward to following your patient along with you  Sincerely,        Elijah Cook MD        CC: No Recipients  Elijah Cook MD  6/20/2018  4:17 PM  Sign at close encounter  Assessment/Plan:    Overactive bladder    Continue Myrbetriq  Nocturia    Continue hydrochlorothiazide to avoid daytime fluid retention which the underlying cause of much of his nocturia  Diagnoses and all orders for this visit:    Overactive bladder  -     POCT urine dip auto non-scope  -     Mirabegron ER (MYRBETRIQ) 50 MG TB24; Take 1 tablet (50 mg total) by mouth daily  -     PSA, Total Screen; Future    Nocturia  -     hydrochlorothiazide (HYDRODIURIL) 25 mg tablet; Take 1 tablet (25 mg total) by mouth daily    Other specified counseling    Other orders  -     ibuprofen (MOTRIN) 600 mg tablet; Take 600 mg by mouth as needed          Subjective:      Patient ID: Bushra Pantoja is a 78 y o  male  HPI  Nocturia:  Nocturia 3 times  Pt's AUA Score down 10 pts and he is delighted  The patient's wife also notes decrease in ankle edema  His quality of life is now excellent  I would like to continue the hydrochlorothiazide  and the Myrbetriq  The following portions of the patient's history were reviewed and updated as appropriate: allergies, current medications, past family history, past medical history, past social history, past surgical history and problem list     Review of Systems    Constitutional: Negative for activity change and fatigue     Respiratory: Negative for shortness of breath and wheezing     Cardiovascular: Negative for chest pain  Gastrointestinal: Negative for abdominal pain  Genitourinary: Negative for difficulty urinating, dysuria, frequency, hematuria and urgency  Musculoskeletal: Negative for back pain and gait problem         Still using a cane 6 months after hip replacement surgery   Skin: Negative     Allergic/Immunologic: Negative     Neurological: Negative          Admitted twice last year for "brain swelling" of unknown cause   Going to [facility] tomorrow for eval      Psychiatric/Behavioral: Negative  Objective:      /76 (BP Location: Left arm, Patient Position: Sitting, Cuff Size: Standard)   Ht 6' 1" (1 854 m)   Wt 104 kg (230 lb)   BMI 30 34 kg/m²           Physical Exam   Constitutional: He is oriented to person, place, and time  He appears well-developed and well-nourished  HENT:   Head: Normocephalic and atraumatic  Neck: Normal range of motion  Neck supple  Pulmonary/Chest: Effort normal    Musculoskeletal: Normal range of motion  Neurological: He is alert and oriented to person, place, and time  He has normal reflexes  Skin: Skin is warm and dry  Psychiatric: He has a normal mood and affect   His behavior is normal  Judgment and thought content normal

## 2018-06-20 NOTE — ASSESSMENT & PLAN NOTE
Continue hydrochlorothiazide to avoid daytime fluid retention which the underlying cause of much of his nocturia

## 2018-06-20 NOTE — PROGRESS NOTES
IPSS Questionnaire (AUA-7): Over the past month    1)  How often have you had a sensation of not emptying your bladder completely after you finish urinating? 1 - Less than 1 time in 5   2)  How often have you had to urinate again less than two hours after you finished urinating? 1 - Less than 1 time in 5   3)  How often have you found you stopped and started again several times when you urinated? 2 - Less than half the time   4) How difficult have you found it to postpone urination? 2 - Less than half the time   5) How often have you had a weak urinary stream?  2 - Less than half the time   6) How often have you had to push or strain to begin urination? 2 - Less than half the time   7) How many times did you most typically get up to urinate from the time you went to bed until the time you got up in the morning? 3 - 3 times   Total Score:  13     QOL: Delighted

## 2018-06-20 NOTE — PROGRESS NOTES
Assessment/Plan:    Overactive bladder    Continue Myrbetriq  Nocturia    Continue hydrochlorothiazide to avoid daytime fluid retention which the underlying cause of much of his nocturia  Diagnoses and all orders for this visit:    Overactive bladder  -     POCT urine dip auto non-scope  -     Mirabegron ER (MYRBETRIQ) 50 MG TB24; Take 1 tablet (50 mg total) by mouth daily  -     PSA, Total Screen; Future    Nocturia  -     hydrochlorothiazide (HYDRODIURIL) 25 mg tablet; Take 1 tablet (25 mg total) by mouth daily    Other specified counseling    Other orders  -     ibuprofen (MOTRIN) 600 mg tablet; Take 600 mg by mouth as needed          Subjective:      Patient ID: Dona Rosa is a 78 y o  male  HPI  Nocturia:  Nocturia 3 times  Pt's AUA Score down 10 pts and he is delighted  The patient's wife also notes decrease in ankle edema  His quality of life is now excellent  I would like to continue the hydrochlorothiazide  and the Myrbetriq  The following portions of the patient's history were reviewed and updated as appropriate: allergies, current medications, past family history, past medical history, past social history, past surgical history and problem list     Review of Systems    Constitutional: Negative for activity change and fatigue  Respiratory: Negative for shortness of breath and wheezing     Cardiovascular: Negative for chest pain  Gastrointestinal: Negative for abdominal pain  Genitourinary: Negative for difficulty urinating, dysuria, frequency, hematuria and urgency     Musculoskeletal: Negative for back pain and gait problem         Still using a cane 6 months after hip replacement surgery   Skin: Negative     Allergic/Immunologic: Negative     Neurological: Negative          Admitted twice last year for "brain swelling" of unknown cause   Going to [facility] tomorrow for eval      Psychiatric/Behavioral: Negative  Objective:      /76 (BP Location: Left arm, Patient Position: Sitting, Cuff Size: Standard)   Ht 6' 1" (1 854 m)   Wt 104 kg (230 lb)   BMI 30 34 kg/m²          Physical Exam   Constitutional: He is oriented to person, place, and time  He appears well-developed and well-nourished  HENT:   Head: Normocephalic and atraumatic  Neck: Normal range of motion  Neck supple  Pulmonary/Chest: Effort normal    Musculoskeletal: Normal range of motion  Neurological: He is alert and oriented to person, place, and time  He has normal reflexes  Skin: Skin is warm and dry  Psychiatric: He has a normal mood and affect   His behavior is normal  Judgment and thought content normal

## 2018-06-26 ENCOUNTER — TELEPHONE (OUTPATIENT)
Dept: UROLOGY | Facility: AMBULATORY SURGERY CENTER | Age: 78
End: 2018-06-26

## 2018-06-26 ENCOUNTER — TRANSCRIBE ORDERS (OUTPATIENT)
Dept: ADMINISTRATIVE | Facility: HOSPITAL | Age: 78
End: 2018-06-26

## 2018-06-26 NOTE — TELEPHONE ENCOUNTER
Pt requesting samples due to cost of medication  One months worth dispensed  [phone] Exp: 9/20 Lot: [id number]

## 2018-06-28 ENCOUNTER — TRANSCRIBE ORDERS (OUTPATIENT)
Dept: ADMINISTRATIVE | Facility: HOSPITAL | Age: 78
End: 2018-06-28

## 2018-06-28 DIAGNOSIS — G93.40 ENCEPHALOPATHY, UNSPECIFIED: Primary | ICD-10-CM

## 2018-06-29 ENCOUNTER — APPOINTMENT (OUTPATIENT)
Dept: WOUND CARE | Facility: CLINIC | Age: 78
End: 2018-06-29

## 2018-07-06 ENCOUNTER — APPOINTMENT (OUTPATIENT)
Dept: WOUND CARE | Facility: CLINIC | Age: 78
End: 2018-07-06

## 2018-07-10 ENCOUNTER — OFFICE VISIT (OUTPATIENT)
Dept: VASCULAR SURGERY | Facility: CLINIC | Age: 78
End: 2018-07-10
Payer: COMMERCIAL

## 2018-07-10 DIAGNOSIS — I71.40 ABDOMINAL AORTIC ANEURYSM (AAA) WITHOUT RUPTURE (CMS/HCC): Primary | ICD-10-CM

## 2018-07-10 PROCEDURE — 99214 OFFICE O/P EST MOD 30 MIN: CPT | Performed by: SURGERY

## 2018-07-10 RX ORDER — LORATADINE 10 MG/1
10 TABLET ORAL DAILY
COMMUNITY
End: 2019-07-26

## 2018-07-10 RX ORDER — HYDROCHLOROTHIAZIDE 25 MG/1
25 TABLET ORAL DAILY
COMMUNITY

## 2018-07-10 RX ORDER — AMLODIPINE BESYLATE 10 MG/1
10 TABLET ORAL DAILY
COMMUNITY

## 2018-07-10 RX ORDER — MIRABEGRON 25 MG/1
50 TABLET, FILM COATED, EXTENDED RELEASE ORAL DAILY
COMMUNITY

## 2018-07-10 RX ORDER — LABETALOL 300 MG/1
300 TABLET, FILM COATED ORAL 2 TIMES DAILY
COMMUNITY

## 2018-07-10 RX ORDER — FAMOTIDINE 40 MG/1
40 TABLET, FILM COATED ORAL DAILY
COMMUNITY

## 2018-07-10 RX ORDER — PREDNISONE 10 MG/1
10 TABLET ORAL DAILY
COMMUNITY

## 2018-07-10 RX ORDER — LISINOPRIL 40 MG/1
40 TABLET ORAL DAILY
COMMUNITY

## 2018-07-10 RX ORDER — DIVALPROEX SODIUM 250 MG/1
250 TABLET, FILM COATED, EXTENDED RELEASE ORAL NIGHTLY
COMMUNITY

## 2018-07-10 RX ORDER — FENOFIBRATE 134 MG/1
160 CAPSULE ORAL
COMMUNITY
End: 2019-09-24 | Stop reason: ENTERED-IN-ERROR

## 2018-07-10 NOTE — ASSESSMENT & PLAN NOTE
Patient with history of endovascular aneurysm repair in 2008.  Ultrasound shows stable aneurysm sac without evidence of endoleak.  Recommend aortic ultrasound in 1 year with an outpatient visit at that time.

## 2018-07-10 NOTE — PROGRESS NOTES
Vascular Surgery Follow up Visit  HPI     Patient is a 77 y.o. male who has a past history of endovascular aneurysm repair in July 2008.  He was last seen on May 30, 2017.  He presents at this time for routine follow-up evaluation.  Ultrasound was performed May 9, 2018    Medical History: No past medical history on file.    Surgical History: No past surgical history on file.    Social History:   Social History     Social History Narrative   • No narrative on file       Family History: No family history on file.    Allergies: Aspirin and Diazepam    Current Medications:  •  amLODIPine  •  divalproex  •  famotidine  •  fenofibrate micronized  •  hydrochlorothiazide  •  labetalol  •  lisinopril  •  loratadine  •  mirabegron  •  predniSONE    Review of Systems  Systemic: No fever, no chills, and no recent weight change. No headache.  Neck: No neck pain, no neck stiffness.  Patient had 3 teeth removed.  Continues to have discomfort from this and is continued to have some swelling and ecchymosis in the neck.  Otolaryngeal: no hoarseness, no dysphagia.   Cardiovascular: No chest pain or discomfort, no palpitations.  Respiratory: No wheezing.  Gastrointestinal: Appetite without significant change. No dysphagia, no active abdominal pain, and no melena. No bright red blood per rectum.  Genitourinary: No hematuria, No genital lesion.  No obvious bladder changes.   Endocrine: No polydipsia and no excessive sweating.  Hematologic: No easy bleeding and no tendency for easy bruising.   Integumentary: No obvious masses  Musculoskeletal: No new muscle tenderness.  Neurological: No new motor disturbances, or sensory disturbances.   Psychological: Appropriate.  Skin: No pruritus. No skin lesions and no rash        Objective     Vital Signs for the last 24 hours:  BP: ()/()   Arterial Line BP: ()/()     Physicial Exam    General appearance: alert, appears stated age and cooperative  Head: normocephalic, without obvious abnormality,  atraumatic  Neck: supple, symmetrical, trachea midline.  There is no JVD.  Carotid arteries have good upstroke without carotid bruits.  There is no lymphadenopathy.  Mild swelling and ecchymosis in the neck  Heart: Regular and rhythmic without murmur gallop.  Lungs: clear to auscultation bilaterally  Chest wall: no tenderness  Back: symmetric, no curvature. ROM normal. No CVA tenderness  Abdomen: soft, non-tender; bowel sounds normal; no masses, no organomegaly.  No hernias.  No intra-abdominal bruits.  Extremities: extremities warm, no cyanosis, clubbing, or edema  Pulses: 2+ and symmetric  Skin: Skin color, texture, turgor normal. No rashes or lesions  Neurologic: Grossly normal        Labs    No new labs.    Imaging  Small stable aneurysm sac with excellent flow through the graft and no evidence of endoleak.    Assessment and Plan    Problem List     Abdominal aortic aneurysm (AAA) without rupture (CMS/HCC) ([facility]) - Primary    Overview     July 2, 2008: Endovascular aneurysm repair with Cook [facility]ith         Current Assessment & Plan     Patient with history of endovascular aneurysm repair in 2008.  Ultrasound shows stable aneurysm sac without evidence of endoleak.  Recommend aortic ultrasound in 1 year with an outpatient visit at that time.         Relevant Orders    Ultrasound abdominal aorta          Gurdeep Guzmán MD, FACS  Chief, Division of Vascular Surgery  [facility]      Thank you very much for allowing us to participate in the care of your patient.  I will keep you informed ofhis care.  Please not hesitate to call or email if there are any questions.  I can be reached at [phone](mobile) or [email or web address].  Sincerely.    Chad Guzmán

## 2018-07-10 NOTE — LETTER
July 10, 2018     Douglas C Shoenberger, MD  [address] [facility] [address]    Patient: Anderson Muñoz   YOB: 1940   Date of Visit: 7/10/2018       Dear Dr. Shoenberger:    Thank you for referring Anderson Muñoz to me for evaluation. Below are my notes for this consultation.    If you have questions, please do not hesitate to call me. I look forward to following your patient along with you.         Sincerely,        Gurdeep Guzmán MD FACS        CC: No Recipients  Gurdeep Guzmán MD FACS  7/10/2018  2:57 PM  Sign at close encounter  Vascular Surgery Follow up Visit  HPI     Patient is a 77 y.o. male who has a past history of endovascular aneurysm repair in July 2008.  He was last seen on May 30, 2017.  He presents at this time for routine follow-up evaluation.  Ultrasound was performed May 9, 2018    Medical History: No past medical history on file.    Surgical History: No past surgical history on file.    Social History:   Social History     Social History Narrative   • No narrative on file       Family History: No family history on file.    Allergies: Aspirin and Diazepam    Current Medications:  •  amLODIPine  •  divalproex  •  famotidine  •  fenofibrate micronized  •  hydrochlorothiazide  •  labetalol  •  lisinopril  •  loratadine  •  mirabegron  •  predniSONE    Review of Systems  Systemic: No fever, no chills, and no recent weight change. No headache.  Neck: No neck pain, no neck stiffness.  Patient had 3 teeth removed.  Continues to have discomfort from this and is continued to have some swelling and ecchymosis in the neck.  Otolaryngeal: no hoarseness, no dysphagia.   Cardiovascular: No chest pain or discomfort, no palpitations.  Respiratory: No wheezing.  Gastrointestinal: Appetite without significant change. No dysphagia, no active abdominal pain, and no melena. No bright red blood per rectum.  Genitourinary: No hematuria, No genital lesion.  No obvious bladder changes.    Endocrine: No polydipsia and no excessive sweating.  Hematologic: No easy bleeding and no tendency for easy bruising.   Integumentary: No obvious masses  Musculoskeletal: No new muscle tenderness.  Neurological: No new motor disturbances, or sensory disturbances.   Psychological: Appropriate.  Skin: No pruritus. No skin lesions and no rash        Objective     Vital Signs for the last 24 hours:  BP: ()/()   Arterial Line BP: ()/()     Physicial Exam    General appearance: alert, appears stated age and cooperative  Head: normocephalic, without obvious abnormality, atraumatic  Neck: supple, symmetrical, trachea midline.  There is no JVD.  Carotid arteries have good upstroke without carotid bruits.  There is no lymphadenopathy.  Mild swelling and ecchymosis in the neck  Heart: Regular and rhythmic without murmur gallop.  Lungs: clear to auscultation bilaterally  Chest wall: no tenderness  Back: symmetric, no curvature. ROM normal. No CVA tenderness  Abdomen: soft, non-tender; bowel sounds normal; no masses, no organomegaly.  No hernias.  No intra-abdominal bruits.  Extremities: extremities warm, no cyanosis, clubbing, or edema  Pulses: 2+ and symmetric  Skin: Skin color, texture, turgor normal. No rashes or lesions  Neurologic: Grossly normal        Labs    No new labs.    Imaging  Small stable aneurysm sac with excellent flow through the graft and no evidence of endoleak.    Assessment and Plan    Problem List     Abdominal aortic aneurysm (AAA) without rupture (CMS/HCC) ([facility]) - Primary    Overview     July 2, 2008: Endovascular aneurysm repair with Cook Zenith         Current Assessment & Plan     Patient with history of endovascular aneurysm repair in 2008.  Ultrasound shows stable aneurysm sac without evidence of endoleak.  Recommend aortic ultrasound in 1 year with an outpatient visit at that time.         Relevant Orders    Ultrasound abdominal aorta          Gurdeep Guzmán MD, FACS  Chief, Division of  Vascular Surgery  [facility]      Thank you very much for allowing us to participate in the care of your patient.  I will keep you informed ofhis care.  Please not hesitate to call or email if there are any questions.  I can be reached at [phone](mobile) or [email or web address].  Sincerely.    Chad Guzmán

## 2018-08-20 DIAGNOSIS — N32.81 OVERACTIVE BLADDER: ICD-10-CM

## 2018-08-20 NOTE — TELEPHONE ENCOUNTER
Patient's wife called on his behalf  They are in the "donut hole" and she was wondering if we had sample medications to help defray costs  I explained we don't carry samples any longer  Wife also stated that the pharmacy doesn't have the script Dr Pauly Jackson sent back in June, 2018  I re-queued the script and re-wrote it for a 30 day supply

## 2018-08-21 ENCOUNTER — TRANSCRIBE ORDERS (OUTPATIENT)
Dept: ADMINISTRATIVE | Facility: HOSPITAL | Age: 78
End: 2018-08-21

## 2018-08-21 ENCOUNTER — ANESTHESIA EVENT (OUTPATIENT)
Dept: MRI IMAGING | Facility: HOSPITAL | Age: 78
End: 2018-08-21

## 2018-08-21 NOTE — ANESTHESIA PREPROCEDURE EVALUATION
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 ? On steroids   Short term memory loss   Prednisone 17 5 mg  (pt didn't take it today ) Psychology           Physical Exam    Airway    Mallampati score: II  TM Distance: >3 FB  Neck ROM: full     Dental       Cardiovascular  Cardiovascular exam normal    Pulmonary  Pulmonary exam normal     Other Findings        Anesthesia Plan  ASA Score- 3     Anesthesia Type- IV sedation with anesthesia with ASA Monitors  Additional Monitors:   Airway Plan:         Plan Factors-Patient not instructed to abstain from smoking on day of procedure  Patient did not smoke on day of surgery  Induction- intravenous  Postoperative Plan-     Informed Consent- Anesthetic plan and risks discussed with patient  Echocardiogram 4/2017:  Normal left ventricular chamber size  Normal left ventricular systolic    function  Normal regional wall motion  Estimated left ventricular   ejection  fraction is 60%  Interventricular septal wall thickness is moderately increased  Posterior wall thickness is mildly increased   Abnormal septal motion      No results found for: HGBA1C    No results found for: NA, K, CL, CO2, ANIONGAP, BUN, CREATININE, GLUCOSE, GLUF, CALCIUM, CORRECTEDCA, AST, ALT, ALKPHOS, PROT, ALBUMIN, BILITOT, EGFR    Lab Results   Component Value Date    WBC 7 26 06/01/2016    HGB 13 4 06/01/2016    HCT 39 2 06/01/2016    MCV 90 06/01/2016     06/01/2016 Jan 2018 labs noted

## 2018-08-22 ENCOUNTER — HOSPITAL ENCOUNTER (OUTPATIENT)
Dept: MRI IMAGING | Facility: HOSPITAL | Age: 78
Discharge: HOME | End: 2018-08-22
Payer: COMMERCIAL

## 2018-08-22 ENCOUNTER — ANESTHESIA (OUTPATIENT)
Dept: MRI IMAGING | Facility: HOSPITAL | Age: 78
End: 2018-08-22

## 2018-08-22 VITALS
RESPIRATION RATE: 18 BRPM | WEIGHT: 220 LBS | HEART RATE: 57 BPM | OXYGEN SATURATION: 95 % | HEIGHT: 73 IN | BODY MASS INDEX: 29.16 KG/M2 | DIASTOLIC BLOOD PRESSURE: 55 MMHG | SYSTOLIC BLOOD PRESSURE: 118 MMHG | TEMPERATURE: 98.5 F

## 2018-08-22 DIAGNOSIS — G93.40 ENCEPHALOPATHY, UNSPECIFIED: ICD-10-CM

## 2018-08-22 PROCEDURE — 70553 MRI BRAIN STEM W/O & W/DYE: CPT

## 2018-08-22 PROCEDURE — A9585 GADOBUTROL INJECTION: HCPCS | Performed by: FAMILY MEDICINE

## 2018-08-22 RX ORDER — SODIUM CHLORIDE, SODIUM LACTATE, POTASSIUM CHLORIDE, CALCIUM CHLORIDE 600; 310; 30; 20 MG/100ML; MG/100ML; MG/100ML; MG/100ML
50 INJECTION, SOLUTION INTRAVENOUS CONTINUOUS
Status: DISCONTINUED | OUTPATIENT
Start: 2018-08-22 | End: 2018-08-26 | Stop reason: HOSPADM

## 2018-08-22 RX ORDER — PROPOFOL 10 MG/ML
INJECTION, EMULSION INTRAVENOUS AS NEEDED
Status: DISCONTINUED | OUTPATIENT
Start: 2018-08-22 | End: 2018-08-22 | Stop reason: SURG

## 2018-08-22 RX ORDER — PROPOFOL 10 MG/ML
INJECTION, EMULSION INTRAVENOUS CONTINUOUS PRN
Status: DISCONTINUED | OUTPATIENT
Start: 2018-08-22 | End: 2018-08-22 | Stop reason: SURG

## 2018-08-22 RX ADMIN — PROPOFOL 80 MG: 10 INJECTION, EMULSION INTRAVENOUS at 08:56

## 2018-08-22 RX ADMIN — SODIUM CHLORIDE, SODIUM LACTATE, POTASSIUM CHLORIDE, AND CALCIUM CHLORIDE 50 ML/HR: .6; .31; .03; .02 INJECTION, SOLUTION INTRAVENOUS at 06:44

## 2018-08-22 RX ADMIN — PROPOFOL 75 MCG/KG/MIN: 10 INJECTION, EMULSION INTRAVENOUS at 08:56

## 2018-08-22 RX ADMIN — GADOBUTROL 5 ML: 604.72 INJECTION INTRAVENOUS at 09:57

## 2018-08-22 RX ADMIN — SODIUM CHLORIDE, SODIUM LACTATE, POTASSIUM CHLORIDE, AND CALCIUM CHLORIDE: .6; .31; .03; .02 INJECTION, SOLUTION INTRAVENOUS at 07:17

## 2018-08-22 NOTE — ANESTHESIA POSTPROCEDURE EVALUATION
Post-Op Assessment Note      CV Status:  Stable    Mental Status:  Alert and awake    Hydration Status:  Euvolemic    PONV Controlled:  Controlled    Airway Patency:  Patent    Post Op Vitals Reviewed: Yes          Staff: CRNA, Anesthesiologist           BP   118/55   Temp  98 5   Pulse  59   Resp   16   SpO2   98

## 2018-08-22 NOTE — PERIOPERATIVE NURSING NOTE
Returned from [facility] be nurse anestetist in no distress  Awake, drinking and eating  Denies pain    ivs infusing

## 2018-08-22 NOTE — PERIOPERATIVE NURSING NOTE
ivs out  Walked to the br  Wants to go home    Discharged via w/c after discharge instructions given and verbalized an understanding of same

## 2018-11-12 ENCOUNTER — TRANSCRIBE ORDERS (OUTPATIENT)
Dept: ADMINISTRATIVE | Facility: HOSPITAL | Age: 78
End: 2018-11-12

## 2018-11-12 DIAGNOSIS — Z79.52 LONG TERM CURRENT USE OF SYSTEMIC STEROIDS: ICD-10-CM

## 2018-11-12 DIAGNOSIS — I77.6 CNS VASCULITIS (HCC): Primary | ICD-10-CM

## 2018-12-12 ENCOUNTER — HOSPITAL ENCOUNTER (OUTPATIENT)
Dept: BONE DENSITY | Facility: CLINIC | Age: 78
Discharge: HOME | End: 2018-12-12
Payer: COMMERCIAL

## 2018-12-12 DIAGNOSIS — I77.6 CNS VASCULITIS (HCC): ICD-10-CM

## 2018-12-12 DIAGNOSIS — Z79.52 LONG TERM CURRENT USE OF SYSTEMIC STEROIDS: ICD-10-CM

## 2018-12-12 PROCEDURE — 77080 DXA BONE DENSITY AXIAL: CPT

## 2019-04-04 ENCOUNTER — TRANSCRIBE ORDERS (OUTPATIENT)
Dept: ADMINISTRATIVE | Facility: HOSPITAL | Age: 79
End: 2019-04-04

## 2019-04-04 DIAGNOSIS — E85.4 AMYLOIDOSIS VI (HCC): Primary | ICD-10-CM

## 2019-04-04 DIAGNOSIS — I68.0 AMYLOIDOSIS VI (HCC): Primary | ICD-10-CM

## 2019-04-23 ENCOUNTER — ANESTHESIA EVENT (OUTPATIENT)
Dept: MRI IMAGING | Facility: HOSPITAL | Age: 79
End: 2019-04-23

## 2019-04-24 ENCOUNTER — HOSPITAL ENCOUNTER (OUTPATIENT)
Dept: MRI IMAGING | Facility: HOSPITAL | Age: 79
Discharge: HOME | End: 2019-04-24
Payer: COMMERCIAL

## 2019-04-24 ENCOUNTER — ANESTHESIA (OUTPATIENT)
Dept: MRI IMAGING | Facility: HOSPITAL | Age: 79
End: 2019-04-24

## 2019-04-24 VITALS
RESPIRATION RATE: 16 BRPM | SYSTOLIC BLOOD PRESSURE: 119 MMHG | TEMPERATURE: 97 F | HEART RATE: 61 BPM | DIASTOLIC BLOOD PRESSURE: 63 MMHG | OXYGEN SATURATION: 96 %

## 2019-04-24 DIAGNOSIS — E85.4 AMYLOIDOSIS VI (HCC): ICD-10-CM

## 2019-04-24 DIAGNOSIS — I68.0 AMYLOIDOSIS VI (HCC): ICD-10-CM

## 2019-04-24 PROCEDURE — A9585 GADOBUTROL INJECTION: HCPCS | Performed by: FAMILY MEDICINE

## 2019-04-24 PROCEDURE — 70553 MRI BRAIN STEM W/O & W/DYE: CPT

## 2019-04-24 RX ORDER — PROPOFOL 10 MG/ML
INJECTION, EMULSION INTRAVENOUS CONTINUOUS PRN
Status: DISCONTINUED | OUTPATIENT
Start: 2019-04-24 | End: 2019-04-24 | Stop reason: SURG

## 2019-04-24 RX ORDER — LIDOCAINE HYDROCHLORIDE 10 MG/ML
INJECTION, SOLUTION INFILTRATION; PERINEURAL AS NEEDED
Status: DISCONTINUED | OUTPATIENT
Start: 2019-04-24 | End: 2019-04-24 | Stop reason: SURG

## 2019-04-24 RX ORDER — SODIUM CHLORIDE 9 MG/ML
125 INJECTION, SOLUTION INTRAVENOUS CONTINUOUS
Status: DISCONTINUED | OUTPATIENT
Start: 2019-04-24 | End: 2019-04-28 | Stop reason: HOSPADM

## 2019-04-24 RX ORDER — PROPOFOL 10 MG/ML
INJECTION, EMULSION INTRAVENOUS AS NEEDED
Status: DISCONTINUED | OUTPATIENT
Start: 2019-04-24 | End: 2019-04-24 | Stop reason: SURG

## 2019-04-24 RX ADMIN — GADOBUTROL 10 ML: 604.72 INJECTION INTRAVENOUS at 09:06

## 2019-04-24 RX ADMIN — PROPOFOL 50 MCG/KG/MIN: 10 INJECTION, EMULSION INTRAVENOUS at 08:18

## 2019-04-24 RX ADMIN — SODIUM CHLORIDE 125 ML/HR: 9 INJECTION, SOLUTION INTRAVENOUS at 07:12

## 2019-04-24 RX ADMIN — PROPOFOL 50 MG: 10 INJECTION, EMULSION INTRAVENOUS at 08:18

## 2019-04-24 RX ADMIN — PROPOFOL 20 MG: 10 INJECTION, EMULSION INTRAVENOUS at 08:20

## 2019-04-24 RX ADMIN — LIDOCAINE HYDROCHLORIDE 50 MG: 10 INJECTION, SOLUTION INFILTRATION; PERINEURAL at 08:18

## 2019-06-11 ENCOUNTER — TRANSCRIBE ORDERS (OUTPATIENT)
Dept: ADMINISTRATIVE | Facility: HOSPITAL | Age: 79
End: 2019-06-11

## 2019-06-11 DIAGNOSIS — I68.0 CEREBRAL AMYLOID ANGIOPATHY (HCC): ICD-10-CM

## 2019-06-11 DIAGNOSIS — E85.4 CEREBRAL AMYLOID ANGIOPATHY (HCC): ICD-10-CM

## 2019-06-11 DIAGNOSIS — I71.4 ABDOMINAL AORTIC ANEURYSM WITHOUT RUPTURE (HCC): Primary | ICD-10-CM

## 2019-06-13 ENCOUNTER — HOSPITAL ENCOUNTER (OUTPATIENT)
Dept: ULTRASOUND IMAGING | Facility: HOSPITAL | Age: 79
Discharge: HOME | End: 2019-06-13
Payer: COMMERCIAL

## 2019-06-13 DIAGNOSIS — I71.4 ABDOMINAL AORTIC ANEURYSM WITHOUT RUPTURE (HCC): ICD-10-CM

## 2019-06-13 PROCEDURE — 76775 US EXAM ABDO BACK WALL LIM: CPT

## 2019-06-24 ENCOUNTER — OFFICE VISIT (OUTPATIENT)
Dept: UROLOGY | Facility: MEDICAL CENTER | Age: 79
End: 2019-06-24
Payer: COMMERCIAL

## 2019-06-24 VITALS
SYSTOLIC BLOOD PRESSURE: 124 MMHG | DIASTOLIC BLOOD PRESSURE: 68 MMHG | HEIGHT: 73 IN | HEART RATE: 57 BPM | WEIGHT: 220 LBS | BODY MASS INDEX: 29.16 KG/M2

## 2019-06-24 DIAGNOSIS — Z71.89 OTHER SPECIFIED COUNSELING: ICD-10-CM

## 2019-06-24 DIAGNOSIS — N13.8 BPH WITH URINARY OBSTRUCTION: ICD-10-CM

## 2019-06-24 DIAGNOSIS — N32.81 OVERACTIVE BLADDER: Primary | ICD-10-CM

## 2019-06-24 DIAGNOSIS — N40.1 BPH WITH URINARY OBSTRUCTION: ICD-10-CM

## 2019-06-24 LAB
SL AMB  POCT GLUCOSE, UA: NEGATIVE
SL AMB LEUKOCYTE ESTERASE,UA: NEGATIVE
SL AMB POCT BILIRUBIN,UA: NEGATIVE
SL AMB POCT BLOOD,UA: ABNORMAL
SL AMB POCT CLARITY,UA: CLEAR
SL AMB POCT COLOR,UA: YELLOW
SL AMB POCT KETONES,UA: NEGATIVE
SL AMB POCT NITRITE,UA: NEGATIVE
SL AMB POCT PH,UA: 6
SL AMB POCT SPECIFIC GRAVITY,UA: 1.01
SL AMB POCT URINE PROTEIN: NEGATIVE
SL AMB POCT UROBILINOGEN: 0.2

## 2019-06-24 PROCEDURE — 81003 URINALYSIS AUTO W/O SCOPE: CPT | Performed by: UROLOGY

## 2019-06-24 PROCEDURE — 99214 OFFICE O/P EST MOD 30 MIN: CPT | Performed by: UROLOGY

## 2019-06-29 DIAGNOSIS — R35.1 NOCTURIA: ICD-10-CM

## 2019-06-29 RX ORDER — HYDROCHLOROTHIAZIDE 25 MG/1
TABLET ORAL
Qty: 90 TABLET | Refills: 3 | OUTPATIENT
Start: 2019-06-29

## 2019-07-02 DIAGNOSIS — N32.81 OVERACTIVE BLADDER: ICD-10-CM

## 2019-07-02 DIAGNOSIS — R35.1 NOCTURIA: ICD-10-CM

## 2019-07-02 NOTE — TELEPHONE ENCOUNTER
Patient left message on the Medication Refill voice mail line requesting a new prescription for HCTZ 25mg and Myrbetriq 50mg, to [facility]/pharmacy in [place], [place]

## 2019-07-03 ENCOUNTER — ANESTHESIA (OUTPATIENT)
Dept: MRI IMAGING | Facility: HOSPITAL | Age: 79
End: 2019-07-03

## 2019-07-03 ENCOUNTER — ANESTHESIA EVENT (OUTPATIENT)
Dept: MRI IMAGING | Facility: HOSPITAL | Age: 79
End: 2019-07-03

## 2019-07-03 ENCOUNTER — HOSPITAL ENCOUNTER (OUTPATIENT)
Dept: MRI IMAGING | Facility: HOSPITAL | Age: 79
Discharge: HOME | End: 2019-07-03
Payer: COMMERCIAL

## 2019-07-03 VITALS
TEMPERATURE: 98.3 F | WEIGHT: 220 LBS | HEIGHT: 73 IN | DIASTOLIC BLOOD PRESSURE: 72 MMHG | SYSTOLIC BLOOD PRESSURE: 122 MMHG | BODY MASS INDEX: 29.16 KG/M2 | HEART RATE: 58 BPM | RESPIRATION RATE: 18 BRPM | OXYGEN SATURATION: 98 %

## 2019-07-03 DIAGNOSIS — I68.0 CEREBRAL AMYLOID ANGIOPATHY (HCC): ICD-10-CM

## 2019-07-03 DIAGNOSIS — E85.4 CEREBRAL AMYLOID ANGIOPATHY (HCC): ICD-10-CM

## 2019-07-03 PROCEDURE — A9585 GADOBUTROL INJECTION: HCPCS | Performed by: FAMILY MEDICINE

## 2019-07-03 PROCEDURE — 70553 MRI BRAIN STEM W/O & W/DYE: CPT

## 2019-07-03 RX ORDER — SODIUM CHLORIDE 9 MG/ML
125 INJECTION, SOLUTION INTRAVENOUS CONTINUOUS
Status: DISCONTINUED | OUTPATIENT
Start: 2019-07-03 | End: 2019-07-07 | Stop reason: HOSPADM

## 2019-07-03 RX ORDER — PROPOFOL 10 MG/ML
INJECTION, EMULSION INTRAVENOUS CONTINUOUS PRN
Status: DISCONTINUED | OUTPATIENT
Start: 2019-07-03 | End: 2019-07-03 | Stop reason: SURG

## 2019-07-03 RX ORDER — LIDOCAINE HYDROCHLORIDE 10 MG/ML
INJECTION, SOLUTION INFILTRATION; PERINEURAL AS NEEDED
Status: DISCONTINUED | OUTPATIENT
Start: 2019-07-03 | End: 2019-07-03 | Stop reason: SURG

## 2019-07-03 RX ORDER — GLYCOPYRROLATE 0.2 MG/ML
INJECTION INTRAMUSCULAR; INTRAVENOUS AS NEEDED
Status: DISCONTINUED | OUTPATIENT
Start: 2019-07-03 | End: 2019-07-03 | Stop reason: SURG

## 2019-07-03 RX ORDER — PROPOFOL 10 MG/ML
INJECTION, EMULSION INTRAVENOUS AS NEEDED
Status: DISCONTINUED | OUTPATIENT
Start: 2019-07-03 | End: 2019-07-03 | Stop reason: SURG

## 2019-07-03 RX ORDER — HYDROCHLOROTHIAZIDE 25 MG/1
25 TABLET ORAL DAILY
Qty: 90 TABLET | Refills: 3 | Status: SHIPPED | OUTPATIENT
Start: 2019-07-03 | End: 2020-06-25

## 2019-07-03 RX ADMIN — PROPOFOL 50 MG: 10 INJECTION, EMULSION INTRAVENOUS at 08:07

## 2019-07-03 RX ADMIN — GLYCOPYRROLATE 0.2 MG: 0.2 INJECTION, SOLUTION INTRAMUSCULAR; INTRAVENOUS at 08:15

## 2019-07-03 RX ADMIN — LIDOCAINE HYDROCHLORIDE 50 MG: 10 INJECTION, SOLUTION INFILTRATION; PERINEURAL at 08:04

## 2019-07-03 RX ADMIN — PROPOFOL 70 MCG/KG/MIN: 10 INJECTION, EMULSION INTRAVENOUS at 08:06

## 2019-07-03 RX ADMIN — PROPOFOL 50 MG: 10 INJECTION, EMULSION INTRAVENOUS at 08:06

## 2019-07-03 RX ADMIN — SODIUM CHLORIDE 125 ML/HR: 0.9 INJECTION, SOLUTION INTRAVENOUS at 06:53

## 2019-07-03 RX ADMIN — GADOBUTROL 10 ML: 604.72 INJECTION INTRAVENOUS at 09:31

## 2019-07-03 RX ADMIN — PROPOFOL 70 MG: 10 INJECTION, EMULSION INTRAVENOUS at 08:04

## 2019-07-03 NOTE — TELEPHONE ENCOUNTER
The patient was last seen on 6/24/19 by Dr Clarissa Erickson in the [facility] location; continuation of the medication was authorized at that time    Request for same, 90 day supply with 3 refills was queued and forwarded to SHANITA Scherer for approval

## 2019-07-03 NOTE — ANESTHESIA PREPROCEDURE EVALUATION
Review of Systems/Medical History  Patient summary reviewed    No history of anesthetic complications     Cardiovascular  Exercise tolerance (METS): >4,  Hyperlipidemia, Hypertension controlled, Aortic disease (AAA repair),    Pulmonary  Sleep apnea ,        GI/Hepatic  Negative GI/hepatic ROS          Negative  ROS        Endo/Other  Negative endo/other ROS   Comment: Radical nephrectomy    GYN       Hematology      Comment: Hx of transfusion Musculoskeletal    Arthritis     Neurology  Negative neurology ROS     Comment: Hx of amyloid angiopathy Psychology       Comment: claustrophobia         Physical Exam    Airway    Mallampati score: II  TM Distance: >3 FB  Neck ROM: full     Dental       Cardiovascular  Cardiovascular exam normal    Pulmonary  Pulmonary exam normal     Other Findings        Anesthesia Plan  ASA Score- 3     Anesthesia Type- IV sedation with anesthesia with ASA Monitors  Additional Monitors:   Airway Plan:     Comment: Old chart reviewed  No interval change in health  Plan Factors-Patient not instructed to abstain from smoking on day of procedure  Patient did not smoke on day of surgery  Induction- intravenous  Postoperative Plan- Plan for postoperative opioid use  Informed Consent- Anesthetic plan and risks discussed with patient  I personally reviewed this patient with the CRNA  Discussed and agreed on the Anesthesia Plan with the CRNA  Di Gudino

## 2019-07-03 NOTE — ANESTHESIA POSTPROCEDURE EVALUATION
Post-Op Assessment Note    CV Status:  Stable    Pain management: adequate     Mental Status:  Alert   Hydration Status:  Stable   PONV Controlled:  Controlled   Airway Patency:  Patent   Post Op Vitals Reviewed: Yes      Staff: CRNA           BP      Temp     Pulse    Resp      SpO2

## 2019-07-03 NOTE — DISCHARGE INSTRUCTIONS
Magnetic Resonance Imaging   WHAT YOU NEED TO KNOW:   Magnetic resonance imaging (MRI) is a test that uses magnetic fields and radio waves to take pictures inside of your body  An MRI is used to see blood vessels, tissue, muscles, and bones  It can also show organs, such as your heart, lungs, or liver  An MRI can help your healthcare provider diagnose or treat a medical condition  It does not use radiation  DISCHARGE INSTRUCTIONS:   Drink liquids as directed:  Liquids will help flush the contrast dye out of your body  Ask how much liquid to drink after your MRI, and which liquids to drink  Follow up with your healthcare provider as directed:  Write down your questions so you remember to ask them during your visits  Contact your healthcare provider if:   · You have questions or concerns about your condition or care  Seek care immediately or call 911 if:  You have any signs of an allergic reaction to the contrast dye, such as:  · Trouble breathing    · Dizziness or fainting    · Swelling of your mouth or face    · Nausea or vomiting    · Sudden decrease in urination    · A rash, itching, or swollen skin  © 2017 [facility] Information is for End User's use only and may not be sold, redistributed or otherwise used for commercial purposes  All illustrations and images included in CareNotes® are the copyrighted property of A D A M , Inc  or Anibal Simon  The above information is an  only  It is not intended as medical advice for individual conditions or treatments  Talk to your doctor, nurse or pharmacist before following any medical regimen to see if it is safe and effective for you

## 2019-07-03 NOTE — NURSING NOTE
Returned from MRI at 0930  Alert and oriented  Denies pain  Respirations easy and non labored  HOB elevated  IV fluids continue  Call bell in reach  Wife at bedside

## 2019-07-16 ENCOUNTER — OFFICE VISIT (OUTPATIENT)
Dept: VASCULAR SURGERY | Facility: CLINIC | Age: 79
End: 2019-07-16
Payer: COMMERCIAL

## 2019-07-16 VITALS — DIASTOLIC BLOOD PRESSURE: 79 MMHG | SYSTOLIC BLOOD PRESSURE: 134 MMHG

## 2019-07-16 DIAGNOSIS — I71.40 ABDOMINAL AORTIC ANEURYSM (AAA) WITHOUT RUPTURE (CMS/HCC): Primary | ICD-10-CM

## 2019-07-16 PROCEDURE — 99214 OFFICE O/P EST MOD 30 MIN: CPT | Performed by: SURGERY

## 2019-07-16 RX ORDER — TRAZODONE HYDROCHLORIDE 50 MG/1
50 TABLET ORAL NIGHTLY
COMMUNITY

## 2019-07-16 RX ORDER — IBUPROFEN 600 MG/1
600 TABLET ORAL EVERY 6 HOURS PRN
COMMUNITY
End: 2019-09-24 | Stop reason: ENTERED-IN-ERROR

## 2019-07-16 NOTE — PROGRESS NOTES
Vascular Surgery Follow up Visit  HPI     Patient is a 78 y.o. male who has a past history of endovascular aneurysm repair in July 2008.  He was last seen on 7/10/2018.  He presents at this time for routine follow-up evaluation.  Ultrasound was performed 6/13/2019.  This ultrasound showed potential increase in aneurysm sac size.   Medical History: History reviewed. No pertinent past medical history.    Surgical History:   Past Surgical History:   Procedure Laterality Date   • VASCULAR SURGERY  2008    EVAR       Social History:   Social History     Social History Narrative   • No narrative on file       Family History: History reviewed. No pertinent family history.    Allergies: Aspirin and Diazepam    Current Medications:  •  ibuprofen  •  mycophenolate  •  traZODone  •  amLODIPine  •  divalproex  •  famotidine  •  fenofibrate micronized  •  hydrochlorothiazide  •  labetalol  •  lisinopril  •  loratadine  •  mirabegron  •  predniSONE    Review of Systems  Systemic: No fever, no chills, and no recent weight change. No headache.  Neck: No neck pain, no neck stiffness.  Patient had 3 teeth removed.  Continues to have discomfort from this and is continued to have some swelling and ecchymosis in the neck.  Otolaryngeal: no hoarseness, no dysphagia.   Cardiovascular: No chest pain or discomfort, no palpitations.  Respiratory: No wheezing.  Gastrointestinal: Appetite without significant change. No dysphagia, no active abdominal pain, and no melena. No bright red blood per rectum.  Genitourinary: No hematuria, No genital lesion.  No obvious bladder changes.   Endocrine: No polydipsia and no excessive sweating.  Hematologic: No easy bleeding and no tendency for easy bruising.   Integumentary: No obvious masses  Musculoskeletal: No new muscle tenderness.  Neurological: No new motor disturbances, or sensory disturbances.   Psychological: Appropriate.  Skin: No pruritus. No skin lesions and no rash        Objective     Vital  Signs for the last 24 hours:  BP: (134)/(79) 134/79    Physicial Exam    General appearance: alert, appears stated age and cooperative  Head: normocephalic, without obvious abnormality, atraumatic  Neck: supple, symmetrical, trachea midline.  There is no JVD.  Carotid arteries have good upstroke without carotid bruits.  There is no lymphadenopathy.  Mild swelling and ecchymosis in the neck  Heart: Regular and rhythmic without murmur gallop.  Lungs: clear to auscultation bilaterally  Chest wall: no tenderness  Back: symmetric, no curvature. ROM normal. No CVA tenderness  Abdomen: soft, non-tender; bowel sounds normal; no masses, no organomegaly.  No hernias.  No intra-abdominal bruits.  Extremities: extremities warm, no cyanosis, clubbing, or edema  Pulses: 2+ and symmetric  Skin: Skin color, texture, turgor normal. No rashes or lesions  Neurologic: Grossly normal        Labs    No new labs.    Imaging  Small stable aneurysm sac with excellent flow through the graft and no evidence of endoleak.  Ultrasound shows potential increase in aortic sac diameter.  Assessment and Plan    Problem List Items Addressed This Visit     Abdominal aortic aneurysm (AAA) without rupture (CMS/HCC) - Primary    Overview     July 2, 2008: Endovascular aneurysm repair with [employer]         Current Assessment & Plan         Patient's ultrasound does not show definitive endoleak.78-year-old male status post endovascular aneurysm repair with [employer] system in July 2008.  Recent ultrasound shows potential sac enlargement.  Patient's ultrasound does not show endoleak.  There may be sac enlargement.    Recommend CT angiogram of the abdomen pelvis with follow-up evaluation as an outpatient.         Relevant Orders    CT ANGIOGRAM ABDOMEN PELVIS WITH AND WITHOUT IV CONTRAST    BUN    Creatinine, serum          Gurdeep Guzmán MD, FACS  Chief, Division of Vascular Surgery  [facility]      Thank you very much for allowing us to  participate in the care of your patient.  I will keep you informed ofhis care.  Please not hesitate to call or email if there are any questions.  I can be reached at [phone](mobile) or [email or web address].  Sincerely.    Chad Guzmán

## 2019-07-16 NOTE — ASSESSMENT & PLAN NOTE
Patient's ultrasound does not show definitive endoleak.78-year-old male status post endovascular aneurysm repair with [employer] system in July 2008.  Recent ultrasound shows potential sac enlargement.  Patient's ultrasound does not show endoleak.  There may be sac enlargement.    Recommend CT angiogram of the abdomen pelvis with follow-up evaluation as an outpatient.

## 2019-07-16 NOTE — PATIENT INSTRUCTIONS
Dear Anderson Muñoz    Your physician has requested that you have follow up vascular testing.    To schedule your appointment at the [facility] location, please call [phone], and ask the  to make the appointment.     The name(s) of your test(s) is/are:    CT ANGIOGRAM ABDOMEN PELVIS W WO IV CONTRAST    After you schedule your testing, please call our office at [phone] to schedule your follow appointment with your doctor.    If you have testing outside of the [facility], please obtain a CD and a copy of the report to bring with you when you come to our office for your appointment.      If you have any questions, please do not hesitate to contact our office at [phone].      Thank you,    [facility]

## 2019-07-16 NOTE — LETTER
July 16, 2019     Douglas C Shoenberger, MD  [address] [facility] [address]    Patient: Anderson Muñoz  YOB: 1940  Date of Visit: 7/16/2019      Dear Dr. Shoenberger:    Thank you for referring Anderson Muñoz to me for evaluation. Below are my notes for this consultation.    If you have questions, please do not hesitate to call me. I look forward to following your patient along with you.         Sincerely,        Gurdeep Guzmán MD FACS        CC: No Recipients  Gurdeep Guzmán MD FACS  7/16/2019 12:37 PM  Sign at close encounter  Vascular Surgery Follow up Visit  HPI     Patient is a 78 y.o. male who has a past history of endovascular aneurysm repair in July 2008.  He was last seen on 7/10/2018.  He presents at this time for routine follow-up evaluation.  Ultrasound was performed 6/13/2019.  This ultrasound showed potential increase in aneurysm sac size.   Medical History: History reviewed. No pertinent past medical history.    Surgical History:   Past Surgical History:   Procedure Laterality Date   • VASCULAR SURGERY  2008    EVAR       Social History:   Social History     Social History Narrative   • No narrative on file       Family History: History reviewed. No pertinent family history.    Allergies: Aspirin and Diazepam    Current Medications:  •  ibuprofen  •  mycophenolate  •  traZODone  •  amLODIPine  •  divalproex  •  famotidine  •  fenofibrate micronized  •  hydrochlorothiazide  •  labetalol  •  lisinopril  •  loratadine  •  mirabegron  •  predniSONE    Review of Systems  Systemic: No fever, no chills, and no recent weight change. No headache.  Neck: No neck pain, no neck stiffness.  Patient had 3 teeth removed.  Continues to have discomfort from this and is continued to have some swelling and ecchymosis in the neck.  Otolaryngeal: no hoarseness, no dysphagia.   Cardiovascular: No chest pain or discomfort, no palpitations.  Respiratory: No wheezing.  Gastrointestinal:  Appetite without significant change. No dysphagia, no active abdominal pain, and no melena. No bright red blood per rectum.  Genitourinary: No hematuria, No genital lesion.  No obvious bladder changes.   Endocrine: No polydipsia and no excessive sweating.  Hematologic: No easy bleeding and no tendency for easy bruising.   Integumentary: No obvious masses  Musculoskeletal: No new muscle tenderness.  Neurological: No new motor disturbances, or sensory disturbances.   Psychological: Appropriate.  Skin: No pruritus. No skin lesions and no rash        Objective     Vital Signs for the last 24 hours:  BP: (134)/(79) 134/79    Physicial Exam    General appearance: alert, appears stated age and cooperative  Head: normocephalic, without obvious abnormality, atraumatic  Neck: supple, symmetrical, trachea midline.  There is no JVD.  Carotid arteries have good upstroke without carotid bruits.  There is no lymphadenopathy.  Mild swelling and ecchymosis in the neck  Heart: Regular and rhythmic without murmur gallop.  Lungs: clear to auscultation bilaterally  Chest wall: no tenderness  Back: symmetric, no curvature. ROM normal. No CVA tenderness  Abdomen: soft, non-tender; bowel sounds normal; no masses, no organomegaly.  No hernias.  No intra-abdominal bruits.  Extremities: extremities warm, no cyanosis, clubbing, or edema  Pulses: 2+ and symmetric  Skin: Skin color, texture, turgor normal. No rashes or lesions  Neurologic: Grossly normal        Labs    No new labs.    Imaging  Small stable aneurysm sac with excellent flow through the graft and no evidence of endoleak.  Ultrasound shows potential increase in aortic sac diameter.  Assessment and Plan    Problem List Items Addressed This Visit     Abdominal aortic aneurysm (AAA) without rupture (CMS/[facility]) - Primary    Overview     July 2, 2008: Endovascular aneurysm repair with [employer]         Current Assessment & Plan         Patient's ultrasound does not show definitive  endoleak.78-year-old male status post endovascular aneurysm repair with [employer] system in July 2008.  Recent ultrasound shows potential sac enlargement.  Patient's ultrasound does not show endoleak.  There may be sac enlargement.    Recommend CT angiogram of the abdomen pelvis with follow-up evaluation as an outpatient.         Relevant Orders    CT ANGIOGRAM ABDOMEN PELVIS WITH AND WITHOUT IV CONTRAST    BUN    Creatinine, serum          Gurdeep Guzmán MD, FACS  Chief, Division of Vascular Surgery  [facility]      Thank you very much for allowing us to participate in the care of your patient.  I will keep you informed ofhis care.  Please not hesitate to call or email if there are any questions.  I can be reached at [phone](mobile) or [email or web address].  Sincerely.    Chad Guzmán

## 2019-07-22 ENCOUNTER — TRANSCRIBE ORDERS (OUTPATIENT)
Dept: ADMINISTRATIVE | Facility: HOSPITAL | Age: 79
End: 2019-07-22

## 2019-07-22 DIAGNOSIS — I71.4 ABDOMINAL AORTIC ANEURYSM WITHOUT RUPTURE (HCC): Primary | ICD-10-CM

## 2019-07-29 LAB
BUN SERPL-MCNC: 20 MG/DL (ref 7–25)
BUN/CREAT SERPL: 16 (CALC) (ref 6–22)
CREAT SERPL-MCNC: 1.25 MG/DL (ref 0.7–1.18)
QUEST EGFR NON-AFR. AMERICAN: 55 ML/MIN/1.73M2

## 2019-08-06 ENCOUNTER — HOSPITAL ENCOUNTER (OUTPATIENT)
Dept: RADIOLOGY | Facility: HOSPITAL | Age: 79
Discharge: HOME | End: 2019-08-06
Attending: SURGERY
Payer: COMMERCIAL

## 2019-08-06 DIAGNOSIS — I71.40 ABDOMINAL AORTIC ANEURYSM (AAA) WITHOUT RUPTURE (CMS/HCC): ICD-10-CM

## 2019-08-06 PROCEDURE — 63600105 HC IODINE BASED CONTRAST: Performed by: SURGERY

## 2019-08-06 PROCEDURE — 74174 CTA ABD&PLVS W/CONTRAST: CPT

## 2019-08-06 RX ADMIN — IOHEXOL 125 ML: 300 INJECTION, SOLUTION INTRAVENOUS at 14:30

## 2019-08-27 ENCOUNTER — OFFICE VISIT (OUTPATIENT)
Dept: VASCULAR SURGERY | Facility: CLINIC | Age: 79
End: 2019-08-27
Payer: COMMERCIAL

## 2019-08-27 VITALS — SYSTOLIC BLOOD PRESSURE: 129 MMHG | DIASTOLIC BLOOD PRESSURE: 71 MMHG

## 2019-08-27 PROCEDURE — 99214 OFFICE O/P EST MOD 30 MIN: CPT | Performed by: SURGERY

## 2019-08-27 NOTE — PROGRESS NOTES
Vascular Surgery Follow up Visit  HPI     Patient is a 79 y.o. male who has a past history of endovascular aneurysm repair in July 2008 with [employer] system.  He was last seen on 7/16/2018.  He presents at this time for routine follow-up evaluation.  Ultrasound was performed 6/13/2019.  This ultrasound showed potential increase in aneurysm sac size.  He returns for follow-up at this time with CT scan of the abdomen pelvis.  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 No pertinent past medical history.    Surgical History:   Past Surgical History:   Procedure Laterality Date   • VASCULAR SURGERY  2008    EVAR       Social History:   Social History     Social History Narrative   • No narrative on file       Family History: History reviewed. No pertinent family history.    Allergies: Aspirin and Diazepam    Current Medications:  •  amLODIPine  •  divalproex  •  famotidine  •  fenofibrate micronized  •  hydrochlorothiazide  •  ibuprofen  •  labetalol  •  lisinopril  •  mirabegron  •  mycophenolate  •  predniSONE  •  traZODone    Review of Systems  Systemic: No fever, no chills, and no recent weight change. No headache.  Neck: No neck pain, no neck stiffness.  Patient had 3 teeth removed.  Continues to have discomfort from this and is continued to have some swelling and ecchymosis in the neck.  Otolaryngeal: no hoarseness, no dysphagia.   Cardiovascular: No chest pain or discomfort, no palpitations.  Respiratory: No wheezing.  Gastrointestinal: Appetite without significant change. No dysphagia, no active abdominal pain, and no melena. No bright red blood per rectum.  Genitourinary: No hematuria, No genital lesion.  No obvious bladder changes.   Endocrine: No polydipsia and no excessive sweating.  Hematologic: No easy bleeding and no tendency for easy bruising.   Integumentary: No obvious masses  Musculoskeletal: No new muscle tenderness.  Neurological: No  new motor disturbances, or sensory disturbances.  Continues with some imbalance  Psychological: Appropriate.  Skin: No pruritus. No skin lesions and no rash        Objective     Vital Signs for the last 24 hours:  BP: (129)/(71) 129/71    Physicial Exam    General appearance: alert, appears stated age and cooperative  Head: normocephalic, without obvious abnormality, atraumatic  Neck: supple, symmetrical, trachea midline.  There is no JVD.  Carotid arteries have good upstroke without carotid bruits.  There is no lymphadenopathy.  Mild swelling and ecchymosis in the neck  Heart: Regular and rhythmic without murmur gallop.  Lungs: clear to auscultation bilaterally  Chest wall: no tenderness  Back: symmetric, no curvature. ROM normal. No CVA tenderness  Abdomen: soft, non-tender; bowel sounds normal; no masses, no organomegaly.  No hernias.  No intra-abdominal bruits.  Extremities: extremities warm, no cyanosis, clubbing, or edema  Pulses: 2+ and symmetric  Skin: Skin color, texture, turgor normal. No rashes or lesions  Neurologic: Grossly normal        Labs    No new labs.    Imaging  Small stable aneurysm sac with excellent flow through the graft and no evidence of endoleak.  Ultrasound shows potential increase in aortic sac diameter.    CT angiography reveals a type Ib endoleak in the left iliac system.  Assessment and Plan    Problem List Items Addressed This Visit     Endoleak post endovascular aneurysm repair (CMS/HCC) ([facility]) - Primary    Current Assessment & Plan     79-year-old status post infrarenal aortic aneurysm repair with [employer] system.  Ultrasound revealed enlarging aneurysm sac.  CT scan was performed and this shows a type Ib endoleak.  I feel this can be repaired with an iliac limb.  Discussed with the patient with his risks and benefits.    He will require steroid dosing in the perioperative.               Gurdeep Guzmán MD, FACS  Chief, Division of Vascular Surgery  [facility]      Thank you  very much for allowing us to participate in the care of your patient.  I will keep you informed ofhis care.  Please not hesitate to call or email if there are any questions.  I can be reached at [phone](mobile) or [email or web address].  Sincerely.    Chad Guzmán

## 2019-08-27 NOTE — LETTER
August 27, 2019     Douglas C Shoenberger, MD  [address] [facility] [address]    Patient: Anderson Muñoz  YOB: 1940  Date of Visit: 8/27/2019      Dear Dr. Shoenberger:    Thank you for referring Anderson Muñoz to me for evaluation. Below are my notes for this consultation.    If you have questions, please do not hesitate to call me. I look forward to following your patient along with you.         Sincerely,        Gurdeep Guzmán MD FACS        CC: No Recipients  Gurdeep Guzmán MD FACS  8/27/2019  1:02 PM  Signed  Vascular Surgery Follow up Visit  HPI     Patient is a 79 y.o. male who has a past history of endovascular aneurysm repair in July 2008 with [employer] system.  He was last seen on 7/16/2018.  He presents at this time for routine follow-up evaluation.  Ultrasound was performed 6/13/2019.  This ultrasound showed potential increase in aneurysm sac size.  He returns for follow-up at this time with CT scan of the abdomen pelvis.  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 No pertinent past medical history.    Surgical History:   Past Surgical History:   Procedure Laterality Date   • VASCULAR SURGERY  2008    EVAR       Social History:   Social History     Social History Narrative   • No narrative on file       Family History: History reviewed. No pertinent family history.    Allergies: Aspirin and Diazepam    Current Medications:  •  amLODIPine  •  divalproex  •  famotidine  •  fenofibrate micronized  •  hydrochlorothiazide  •  ibuprofen  •  labetalol  •  lisinopril  •  mirabegron  •  mycophenolate  •  predniSONE  •  traZODone    Review of Systems  Systemic: No fever, no chills, and no recent weight change. No headache.  Neck: No neck pain, no neck stiffness.  Patient had 3 teeth removed.  Continues to have discomfort from this and is continued to have some swelling and ecchymosis in the neck.  Otolaryngeal: no  hoarseness, no dysphagia.   Cardiovascular: No chest pain or discomfort, no palpitations.  Respiratory: No wheezing.  Gastrointestinal: Appetite without significant change. No dysphagia, no active abdominal pain, and no melena. No bright red blood per rectum.  Genitourinary: No hematuria, No genital lesion.  No obvious bladder changes.   Endocrine: No polydipsia and no excessive sweating.  Hematologic: No easy bleeding and no tendency for easy bruising.   Integumentary: No obvious masses  Musculoskeletal: No new muscle tenderness.  Neurological: No new motor disturbances, or sensory disturbances.  Continues with some imbalance  Psychological: Appropriate.  Skin: No pruritus. No skin lesions and no rash        Objective     Vital Signs for the last 24 hours:  BP: (129)/(71) 129/71    Physicial Exam    General appearance: alert, appears stated age and cooperative  Head: normocephalic, without obvious abnormality, atraumatic  Neck: supple, symmetrical, trachea midline.  There is no JVD.  Carotid arteries have good upstroke without carotid bruits.  There is no lymphadenopathy.  Mild swelling and ecchymosis in the neck  Heart: Regular and rhythmic without murmur gallop.  Lungs: clear to auscultation bilaterally  Chest wall: no tenderness  Back: symmetric, no curvature. ROM normal. No CVA tenderness  Abdomen: soft, non-tender; bowel sounds normal; no masses, no organomegaly.  No hernias.  No intra-abdominal bruits.  Extremities: extremities warm, no cyanosis, clubbing, or edema  Pulses: 2+ and symmetric  Skin: Skin color, texture, turgor normal. No rashes or lesions  Neurologic: Grossly normal        Labs    No new labs.    Imaging  Small stable aneurysm sac with excellent flow through the graft and no evidence of endoleak.  Ultrasound shows potential increase in aortic sac diameter.    CT angiography reveals a type Ib endoleak in the left iliac system.  Assessment and Plan    Problem List Items Addressed This Visit      Endoleak post endovascular aneurysm repair (CMS/HCC) (HCC) - Primary    Current Assessment & Plan     79-year-old status post infrarenal aortic aneurysm repair with [employer] system.  Ultrasound revealed enlarging aneurysm sac.  CT scan was performed and this shows a type Ib endoleak.  I feel this can be repaired with an iliac limb.  Discussed with the patient with his risks and benefits.    He will require steroid dosing in the perioperative.               Gurdeep Guzmán MD, FACS  Chief, Division of Vascular Surgery  [facility]      Thank you very much for allowing us to participate in the care of your patient.  I will keep you informed ofhis care.  Please not hesitate to call or email if there are any questions.  I can be reached at [phone](mobile) or [email or web address].  Sincerely.    Chad Guzmán

## 2019-08-27 NOTE — ASSESSMENT & PLAN NOTE
79-year-old status post infrarenal aortic aneurysm repair with [employer] system.  Ultrasound revealed enlarging aneurysm sac.  CT scan was performed and this shows a type Ib endoleak.  I feel this can be repaired with an iliac limb.  Discussed with the patient with his risks and benefits.    He will require steroid dosing in the perioperative.

## 2019-09-19 ENCOUNTER — PREP FOR CASE (OUTPATIENT)
Dept: VASCULAR SURGERY | Facility: CLINIC | Age: 79
End: 2019-09-19

## 2019-09-19 RX ORDER — SODIUM CHLORIDE, SODIUM GLUCONATE, SODIUM ACETATE, POTASSIUM CHLORIDE AND MAGNESIUM CHLORIDE 30; 37; 368; 526; 502 MG/100ML; MG/100ML; MG/100ML; MG/100ML; MG/100ML
125 INJECTION, SOLUTION INTRAVENOUS CONTINUOUS
Status: CANCELLED | OUTPATIENT
Start: 2019-10-11 | End: 2019-10-12

## 2019-09-19 NOTE — H&P
Vascular Surgery H&P  HPI        Patient is a 79 y.o. male who has a past history of endovascular aneurysm repair in July 2008 with [employer] system.  He was last seen on 7/16/2018.  He presents at this time for routine follow-up evaluation.  Ultrasound was performed 6/13/2019.  This ultrasound showed potential increase in aneurysm sac size.  He returns for follow-up at this time with CT scan of the abdomen pelvis.  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 No pertinent past medical history.        Surgical History:   Surgical History         Past Surgical History:   Procedure Laterality Date   • VASCULAR SURGERY   2008     EVAR            Social History:   Social History          Social History Narrative   • No narrative on file         Family History: History reviewed. No pertinent family history.     Allergies: Aspirin and Diazepam     Current Medications:  •  amLODIPine  •  divalproex  •  famotidine  •  fenofibrate micronized  •  hydrochlorothiazide  •  ibuprofen  •  labetalol  •  lisinopril  •  mirabegron  •  mycophenolate  •  predniSONE  •  traZODone     Review of Systems  Systemic: No fever, no chills, and no recent weight change. No headache.  Neck: No neck pain, no neck stiffness.  Patient had 3 teeth removed.  Continues to have discomfort from this and is continued to have some swelling and ecchymosis in the neck.  Otolaryngeal: no hoarseness, no dysphagia.   Cardiovascular: No chest pain or discomfort, no palpitations.  Respiratory: No wheezing.  Gastrointestinal: Appetite without significant change. No dysphagia, no active abdominal pain, and no melena. No bright red blood per rectum.  Genitourinary: No hematuria, No genital lesion.  No obvious bladder changes.   Endocrine: No polydipsia and no excessive sweating.  Hematologic: No easy bleeding and no tendency for easy bruising.   Integumentary: No obvious  masses  Musculoskeletal: No new muscle tenderness.  Neurological: No new motor disturbances, or sensory disturbances.  Continues with some imbalance  Psychological: Appropriate.  Skin: No pruritus. No skin lesions and no rash              Objective         Vital Signs for the last 24 hours:  BP: (129)/(71) 129/71     Physicial Exam     General appearance: alert, appears stated age and cooperative  Head: normocephalic, without obvious abnormality, atraumatic  Neck: supple, symmetrical, trachea midline.  There is no JVD.  Carotid arteries have good upstroke without carotid bruits.  There is no lymphadenopathy.  Mild swelling and ecchymosis in the neck  Heart: Regular and rhythmic without murmur gallop.  Lungs: clear to auscultation bilaterally  Chest wall: no tenderness  Back: symmetric, no curvature. ROM normal. No CVA tenderness  Abdomen: soft, non-tender; bowel sounds normal; no masses, no organomegaly.  No hernias.  No intra-abdominal bruits.  Extremities: extremities warm, no cyanosis, clubbing, or edema  Pulses: 2+ and symmetric  Skin: Skin color, texture, turgor normal. No rashes or lesions  Neurologic: Grossly normal           Labs     No new labs.     Imaging  Small stable aneurysm sac with excellent flow through the graft and no evidence of endoleak.  Ultrasound shows potential increase in aortic sac diameter.     CT angiography reveals a type Ib endoleak in the left iliac system.  Assessment and Plan         Problem List Items Addressed This Visit      Endoleak post endovascular aneurysm repair (CMS/[facility]) ([facility]) - Primary     Current Assessment & Plan       79-year-old status post infrarenal aortic aneurysm repair with [employer] system.  Ultrasound revealed enlarging aneurysm sac.  CT scan was performed and this shows a type Ib endoleak.  I feel this can be repaired with an iliac limb.  Discussed with the patient with his risks and benefits.     He will require steroid dosing in the perioperative.                    Gurdeep Guzmán MD, FACS  Chief, Division of Vascular Surgery  [facility]

## 2019-09-24 ENCOUNTER — APPOINTMENT (OUTPATIENT)
Dept: LAB | Facility: HOSPITAL | Age: 79
End: 2019-09-24
Attending: SURGERY
Payer: MEDICARE

## 2019-09-24 ENCOUNTER — APPOINTMENT (OUTPATIENT)
Dept: PREADMISSION TESTING | Facility: HOSPITAL | Age: 79
End: 2019-09-24
Attending: SURGERY
Payer: MEDICARE

## 2019-09-24 VITALS
DIASTOLIC BLOOD PRESSURE: 70 MMHG | RESPIRATION RATE: 18 BRPM | TEMPERATURE: 97.9 F | OXYGEN SATURATION: 100 % | HEIGHT: 74 IN | SYSTOLIC BLOOD PRESSURE: 125 MMHG | HEART RATE: 55 BPM | BODY MASS INDEX: 27.76 KG/M2 | WEIGHT: 216.3 LBS

## 2019-09-24 LAB
ABO + RH BLD: NORMAL
ALBUMIN SERPL-MCNC: 4 G/DL (ref 3.4–5)
ALP SERPL-CCNC: 30 IU/L (ref 35–126)
ALT SERPL-CCNC: 19 IU/L (ref 16–63)
ANION GAP SERPL CALC-SCNC: 12 MEQ/L (ref 3–15)
AST SERPL-CCNC: 19 IU/L (ref 15–41)
ATRIAL RATE: 52
BASOPHILS # BLD: 0.04 K/UL (ref 0.01–0.1)
BASOPHILS NFR BLD: 0.6 %
BILIRUB SERPL-MCNC: 0.5 MG/DL (ref 0.3–1.2)
BLD GP AB SCN SERPL QL: NEGATIVE
BLOOD BANK CMNT PATIENT-IMP: NORMAL
BUN SERPL-MCNC: 17 MG/DL (ref 8–20)
CALCIUM SERPL-MCNC: 9.2 MG/DL (ref 8.9–10.3)
CHLORIDE SERPL-SCNC: 92 MEQ/L (ref 98–109)
CO2 SERPL-SCNC: 26 MEQ/L (ref 22–32)
CREAT SERPL-MCNC: 1.4 MG/DL
D AG BLD QL: POSITIVE
DIFFERENTIAL METHOD BLD: ABNORMAL
EOSINOPHIL # BLD: 0.12 K/UL (ref 0.04–0.54)
EOSINOPHIL NFR BLD: 1.8 %
ERYTHROCYTE [DISTWIDTH] IN BLOOD BY AUTOMATED COUNT: 13.7 % (ref 11.6–14.4)
GFR SERPL CREATININE-BSD FRML MDRD: 49.1 ML/MIN/1.73M*2
GLUCOSE SERPL-MCNC: 90 MG/DL (ref 70–99)
HCT VFR BLDCO AUTO: 34.8 %
HGB BLD-MCNC: 11.5 G/DL
IMM GRANULOCYTES # BLD AUTO: 0.02 K/UL (ref 0–0.08)
IMM GRANULOCYTES NFR BLD AUTO: 0.3 %
LABORATORY COMMENT REPORT: NORMAL
LYMPHOCYTES # BLD: 0.79 K/UL (ref 1.2–3.5)
LYMPHOCYTES NFR BLD: 12.1 %
MCH RBC QN AUTO: 31.1 PG (ref 28–33.2)
MCHC RBC AUTO-ENTMCNC: 33 G/DL (ref 32.2–36.5)
MCV RBC AUTO: 94.1 FL (ref 83–98)
MONOCYTES # BLD: 0.51 K/UL (ref 0.3–1)
MONOCYTES NFR BLD: 7.8 %
NEUTROPHILS # BLD: 5.03 K/UL (ref 1.7–7)
NEUTS SEG NFR BLD: 77.4 %
NRBC BLD-RTO: 0 %
P AXIS: 38
PDW BLD AUTO: 10.2 FL (ref 9.4–12.4)
PLATELET # BLD AUTO: 166 K/UL
POTASSIUM SERPL-SCNC: 3.4 MEQ/L (ref 3.6–5.1)
PR INTERVAL: 184
PROT SERPL-MCNC: 5.7 G/DL (ref 6–8.2)
QRS DURATION: 118
QT INTERVAL: 472
QTC CALCULATION(BAZETT): 438
R AXIS: 35
RBC # BLD AUTO: 3.7 M/UL (ref 4.5–5.8)
SODIUM SERPL-SCNC: 130 MEQ/L (ref 136–144)
T WAVE AXIS: 53
VENTRICULAR RATE: 52
WBC # BLD AUTO: 6.51 K/UL

## 2019-09-24 PROCEDURE — 93010 ELECTROCARDIOGRAM REPORT: CPT | Performed by: INTERNAL MEDICINE

## 2019-09-24 PROCEDURE — 86850 RBC ANTIBODY SCREEN: CPT

## 2019-09-24 PROCEDURE — 80053 COMPREHEN METABOLIC PANEL: CPT

## 2019-09-24 PROCEDURE — 85025 COMPLETE CBC W/AUTO DIFF WBC: CPT

## 2019-09-24 PROCEDURE — 36415 COLL VENOUS BLD VENIPUNCTURE: CPT

## 2019-09-24 PROCEDURE — 93005 ELECTROCARDIOGRAM TRACING: CPT

## 2019-09-24 RX ORDER — LORATADINE 10 MG/1
10 TABLET ORAL NIGHTLY
Status: ON HOLD | COMMUNITY
End: 2019-10-11

## 2019-09-24 RX ORDER — FENOFIBRATE 160 MG/1
160 TABLET ORAL DAILY
COMMUNITY

## 2019-09-24 ASSESSMENT — PAIN SCALES - GENERAL: PAINLEVEL: 0-NO PAIN

## 2019-09-24 NOTE — PRE-PROCEDURE INSTRUCTIONS
1. We will call you between 3 pm and 7 pm on October 10, 2019 to determine that arrival time for your procedure. If you do not hear by 6PM. Please call [phone] for arrival time.    2. Please report to [facility] in [facility] / j[facility], walk into [facility] and report to the admission desk on first floor on the day of your procedure.   3. Please follow the following fasting guidelines:   Nothing to eat or drink after midnight unless otherwise instructed by  your physician.    4. Early on the morning of the procedure please take your usual dose of the listed medications with a sip of water:    Take only prednisone with a sip of water per Dr Guzmán  Follow all instructions given by Dr Guzmán   5. Other Instructions: shower with CHG soap as instructed   6. If you develop a cold, cough, fever, rash, or other symptom prior to the data of the procedure, please report it to your physician immediately.   7. If you need to cancel the procedure for any reason, please contact your physician or call the unit listed above.   8. Make arrangements to have someone drive you home from the procedure. If you have not arranged for transportation home, your surgery may be cancelled.    9. You may not take public transportation unless accompanied by a responsible person.   10. You may not drive a car or operate complex or potentially dangerous machinery for 24 hours following anesthesia and/or sedation.   11. If it is medically necessary for you to have a longer stay, you will be informed as soon as the decision is made.   12. Do not wear or being anything of value to the hospital including jewelry of any kind. Do not wear make-up or contact lenses. DO bring your glasses and hearing aid.   13. No lotion, creams, powders, or oils on skin the morning of procedure    14. Dress in comfortable clothes.   15.  If instructed, please bring a copy of your Advanced Directive (Living Will/Durable Power of ) on the day of your procedure.      Pre  operative instructions given as per protocol.  Form explained by: Amber Chaidez RN     I have read and understand the above information. I have had sufficient opportunity to ask questions I might have and they have been answered to my satisfaction. I agree to comply with the Patient Responsibilities listed above and have received a copy of this form.

## 2019-10-10 ENCOUNTER — ANESTHESIA EVENT (INPATIENT)
Dept: OPERATING ROOM | Facility: HOSPITAL | Age: 79
DRG: 221 | End: 2019-10-10
Payer: MEDICARE

## 2019-10-11 ENCOUNTER — APPOINTMENT (INPATIENT)
Dept: RADIOLOGY | Facility: HOSPITAL | Age: 79
DRG: 221 | End: 2019-10-11
Attending: SURGERY
Payer: MEDICARE

## 2019-10-11 ENCOUNTER — ANESTHESIA (INPATIENT)
Dept: OPERATING ROOM | Facility: HOSPITAL | Age: 79
DRG: 221 | End: 2019-10-11
Payer: MEDICARE

## 2019-10-11 ENCOUNTER — HOSPITAL ENCOUNTER (INPATIENT)
Facility: HOSPITAL | Age: 79
LOS: 1 days | Discharge: HOME | DRG: 221 | End: 2019-10-12
Attending: SURGERY | Admitting: SURGERY
Payer: MEDICARE

## 2019-10-11 PROCEDURE — 63600000 HC DRUGS/DETAIL CODE: Performed by: SURGERY

## 2019-10-11 PROCEDURE — 63600000 HC DRUGS/DETAIL CODE: Performed by: NURSE ANESTHETIST, CERTIFIED REGISTERED

## 2019-10-11 PROCEDURE — C2628 CATHETER, OCCLUSION: HCPCS | Performed by: SURGERY

## 2019-10-11 PROCEDURE — B310YZZ FLUOROSCOPY OF THORACIC AORTA USING OTHER CONTRAST: ICD-10-PCS | Performed by: SURGERY

## 2019-10-11 PROCEDURE — 25000000 HC PHARMACY GENERAL: Performed by: SURGERY

## 2019-10-11 PROCEDURE — C1887 CATHETER, GUIDING: HCPCS | Performed by: SURGERY

## 2019-10-11 PROCEDURE — C1760 CLOSURE DEV, VASC: HCPCS | Performed by: SURGERY

## 2019-10-11 PROCEDURE — 71000011 HC PACU PHASE 1 EA ADDL MIN: Performed by: SURGERY

## 2019-10-11 PROCEDURE — 25800000 HC PHARMACY IV SOLUTIONS: Performed by: NURSE ANESTHETIST, CERTIFIED REGISTERED

## 2019-10-11 PROCEDURE — C1874 STENT, COATED/COV W/DEL SYS: HCPCS | Performed by: SURGERY

## 2019-10-11 PROCEDURE — 27200000 HC STERILE SUPPLY: Performed by: SURGERY

## 2019-10-11 PROCEDURE — B41JYZZ FLUOROSCOPY OF OTHER LOWER ARTERIES USING OTHER CONTRAST: ICD-10-PCS | Performed by: SURGERY

## 2019-10-11 PROCEDURE — 21400000 HC ROOM AND CARE CCU/INTERMEDIATE

## 2019-10-11 PROCEDURE — 63600000 HC DRUGS/DETAIL CODE: Mod: JW | Performed by: NURSE ANESTHETIST, CERTIFIED REGISTERED

## 2019-10-11 PROCEDURE — 37000002 HC ANESTHESIA MAC: Performed by: SURGERY

## 2019-10-11 PROCEDURE — C1894 INTRO/SHEATH, NON-LASER: HCPCS | Performed by: SURGERY

## 2019-10-11 PROCEDURE — 36000015 HC OR LEVEL 5 EA ADDL MIN: Performed by: SURGERY

## 2019-10-11 PROCEDURE — 25000000 HC PHARMACY GENERAL: Performed by: NURSE ANESTHETIST, CERTIFIED REGISTERED

## 2019-10-11 PROCEDURE — 63700000 HC SELF-ADMINISTRABLE DRUG: Performed by: STUDENT IN AN ORGANIZED HEALTH CARE EDUCATION/TRAINING PROGRAM

## 2019-10-11 PROCEDURE — 02VW3DZ RESTRICTION OF THORACIC AORTA, DESCENDING WITH INTRALUMINAL DEVICE, PERCUTANEOUS APPROACH: ICD-10-PCS | Performed by: SURGERY

## 2019-10-11 PROCEDURE — C1769 GUIDE WIRE: HCPCS | Performed by: SURGERY

## 2019-10-11 PROCEDURE — 63600105 HC IODINE BASED CONTRAST: Performed by: SURGERY

## 2019-10-11 PROCEDURE — 71000001 HC PACU PHASE 1 INITIAL 30MIN: Performed by: SURGERY

## 2019-10-11 PROCEDURE — 25800000 HC PHARMACY IV SOLUTIONS: Performed by: STUDENT IN AN ORGANIZED HEALTH CARE EDUCATION/TRAINING PROGRAM

## 2019-10-11 PROCEDURE — 34710 DLYD PLMT XTN PROSTH 1ST VSL: CPT | Performed by: SURGERY

## 2019-10-11 PROCEDURE — 36000005 HC OR LEVEL 5 INITIAL 30MIN: Performed by: SURGERY

## 2019-10-11 DEVICE — DEVICE CLOSURE PERCLOSE-PROGLIDE: Type: IMPLANTABLE DEVICE | Status: FUNCTIONAL

## 2019-10-11 DEVICE — IMPLANTABLE DEVICE: Type: IMPLANTABLE DEVICE | Site: ILIAC CREST | Status: FUNCTIONAL

## 2019-10-11 RX ORDER — DEXTROSE MONOHYDRATE AND SODIUM CHLORIDE 5; .45 G/100ML; G/100ML
INJECTION, SOLUTION INTRAVENOUS CONTINUOUS
Status: DISCONTINUED | OUTPATIENT
Start: 2019-10-11 | End: 2019-10-12

## 2019-10-11 RX ORDER — LIDOCAINE HYDROCHLORIDE 10 MG/ML
INJECTION, SOLUTION INFILTRATION; PERINEURAL AS NEEDED
Status: DISCONTINUED | OUTPATIENT
Start: 2019-10-11 | End: 2019-10-11 | Stop reason: HOSPADM

## 2019-10-11 RX ORDER — IBUPROFEN 200 MG
16-32 TABLET ORAL AS NEEDED
Status: DISCONTINUED | OUTPATIENT
Start: 2019-10-11 | End: 2019-10-12 | Stop reason: HOSPADM

## 2019-10-11 RX ORDER — GLYCOPYRROLATE 0.6MG/3ML
SYRINGE (ML) INTRAVENOUS AS NEEDED
Status: DISCONTINUED | OUTPATIENT
Start: 2019-10-11 | End: 2019-10-11 | Stop reason: SURG

## 2019-10-11 RX ORDER — SODIUM CHLORIDE, SODIUM GLUCONATE, SODIUM ACETATE, POTASSIUM CHLORIDE AND MAGNESIUM CHLORIDE 30; 37; 368; 526; 502 MG/100ML; MG/100ML; MG/100ML; MG/100ML; MG/100ML
125 INJECTION, SOLUTION INTRAVENOUS CONTINUOUS
Status: DISCONTINUED | OUTPATIENT
Start: 2019-10-11 | End: 2019-10-12

## 2019-10-11 RX ORDER — TAMSULOSIN HYDROCHLORIDE 0.4 MG/1
0.4 CAPSULE ORAL ONCE
Status: CANCELLED | OUTPATIENT
Start: 2019-10-11 | End: 2019-10-12

## 2019-10-11 RX ORDER — ACETAMINOPHEN 325 MG/1
650 TABLET ORAL EVERY 4 HOURS PRN
Status: DISCONTINUED | OUTPATIENT
Start: 2019-10-11 | End: 2019-10-12 | Stop reason: HOSPADM

## 2019-10-11 RX ORDER — FAMOTIDINE 20 MG/1
40 TABLET, FILM COATED ORAL DAILY
Status: DISCONTINUED | OUTPATIENT
Start: 2019-10-11 | End: 2019-10-12 | Stop reason: HOSPADM

## 2019-10-11 RX ORDER — HYDROMORPHONE HYDROCHLORIDE 2 MG/ML
0.5 INJECTION, SOLUTION INTRAMUSCULAR; INTRAVENOUS; SUBCUTANEOUS
Status: DISCONTINUED | OUTPATIENT
Start: 2019-10-11 | End: 2019-10-11 | Stop reason: HOSPADM

## 2019-10-11 RX ORDER — PREDNISONE 10 MG/1
10 TABLET ORAL DAILY
Status: DISCONTINUED | OUTPATIENT
Start: 2019-10-11 | End: 2019-10-12 | Stop reason: HOSPADM

## 2019-10-11 RX ORDER — IBUPROFEN 400 MG/1
400 TABLET ORAL EVERY 6 HOURS PRN
Status: DISCONTINUED | OUTPATIENT
Start: 2019-10-11 | End: 2019-10-12 | Stop reason: HOSPADM

## 2019-10-11 RX ORDER — PROTAMINE SULFATE 10 MG/ML
INJECTION, SOLUTION INTRAVENOUS AS NEEDED
Status: DISCONTINUED | OUTPATIENT
Start: 2019-10-11 | End: 2019-10-11 | Stop reason: SURG

## 2019-10-11 RX ORDER — TRAZODONE HYDROCHLORIDE 50 MG/1
50 TABLET ORAL NIGHTLY
Status: DISCONTINUED | OUTPATIENT
Start: 2019-10-11 | End: 2019-10-12 | Stop reason: HOSPADM

## 2019-10-11 RX ORDER — OXYCODONE HYDROCHLORIDE 5 MG/1
5-10 TABLET ORAL EVERY 4 HOURS PRN
Status: DISCONTINUED | OUTPATIENT
Start: 2019-10-11 | End: 2019-10-12 | Stop reason: HOSPADM

## 2019-10-11 RX ORDER — DIVALPROEX SODIUM 250 MG/1
250 TABLET, FILM COATED, EXTENDED RELEASE ORAL NIGHTLY
Status: DISCONTINUED | OUTPATIENT
Start: 2019-10-11 | End: 2019-10-12 | Stop reason: HOSPADM

## 2019-10-11 RX ORDER — HEPARIN SODIUM 5000 [USP'U]/ML
5000 INJECTION, SOLUTION INTRAVENOUS; SUBCUTANEOUS EVERY 8 HOURS
Status: DISCONTINUED | OUTPATIENT
Start: 2019-10-12 | End: 2019-10-12 | Stop reason: HOSPADM

## 2019-10-11 RX ORDER — DEXTROSE 50 % IN WATER (D50W) INTRAVENOUS SYRINGE
25 AS NEEDED
Status: DISCONTINUED | OUTPATIENT
Start: 2019-10-11 | End: 2019-10-12 | Stop reason: HOSPADM

## 2019-10-11 RX ORDER — HEPARIN SOD,PORCINE/0.9 % NACL 4K/1000 ML
INTRAVENOUS SOLUTION INTRAVENOUS CONTINUOUS PRN
Status: COMPLETED | OUTPATIENT
Start: 2019-10-11 | End: 2019-10-11

## 2019-10-11 RX ORDER — MEPERIDINE HYDROCHLORIDE 50 MG/ML
12.5 INJECTION INTRAMUSCULAR; INTRAVENOUS; SUBCUTANEOUS EVERY 10 MIN PRN
Status: DISCONTINUED | OUTPATIENT
Start: 2019-10-11 | End: 2019-10-11 | Stop reason: HOSPADM

## 2019-10-11 RX ORDER — FENTANYL CITRATE 50 UG/ML
INJECTION, SOLUTION INTRAMUSCULAR; INTRAVENOUS AS NEEDED
Status: DISCONTINUED | OUTPATIENT
Start: 2019-10-11 | End: 2019-10-11 | Stop reason: SURG

## 2019-10-11 RX ORDER — SODIUM CHLORIDE 9 MG/ML
INJECTION, SOLUTION INTRAVENOUS CONTINUOUS PRN
Status: DISCONTINUED | OUTPATIENT
Start: 2019-10-11 | End: 2019-10-11 | Stop reason: SURG

## 2019-10-11 RX ORDER — ONDANSETRON HYDROCHLORIDE 2 MG/ML
4 INJECTION, SOLUTION INTRAVENOUS
Status: DISCONTINUED | OUTPATIENT
Start: 2019-10-11 | End: 2019-10-11 | Stop reason: HOSPADM

## 2019-10-11 RX ORDER — CEFAZOLIN SODIUM 1 G/50ML
SOLUTION INTRAVENOUS AS NEEDED
Status: DISCONTINUED | OUTPATIENT
Start: 2019-10-11 | End: 2019-10-11 | Stop reason: SURG

## 2019-10-11 RX ORDER — MORPHINE SULFATE 2 MG/ML
1-2 INJECTION, SOLUTION INTRAMUSCULAR; INTRAVENOUS
Status: DISCONTINUED | OUTPATIENT
Start: 2019-10-11 | End: 2019-10-12 | Stop reason: HOSPADM

## 2019-10-11 RX ORDER — AMLODIPINE BESYLATE 10 MG/1
10 TABLET ORAL DAILY
Status: DISCONTINUED | OUTPATIENT
Start: 2019-10-11 | End: 2019-10-12 | Stop reason: HOSPADM

## 2019-10-11 RX ORDER — LISINOPRIL 20 MG/1
40 TABLET ORAL DAILY
Status: DISCONTINUED | OUTPATIENT
Start: 2019-10-11 | End: 2019-10-12 | Stop reason: HOSPADM

## 2019-10-11 RX ORDER — HYDROCHLOROTHIAZIDE 25 MG/1
25 TABLET ORAL DAILY
Status: DISCONTINUED | OUTPATIENT
Start: 2019-10-11 | End: 2019-10-12 | Stop reason: HOSPADM

## 2019-10-11 RX ORDER — HEPARIN SODIUM 1000 [USP'U]/ML
INJECTION, SOLUTION INTRAVENOUS; SUBCUTANEOUS AS NEEDED
Status: DISCONTINUED | OUTPATIENT
Start: 2019-10-11 | End: 2019-10-11 | Stop reason: SURG

## 2019-10-11 RX ORDER — NAPROXEN SODIUM 220 MG/1
81 TABLET, FILM COATED ORAL DAILY
Status: DISCONTINUED | OUTPATIENT
Start: 2019-10-12 | End: 2019-10-12 | Stop reason: HOSPADM

## 2019-10-11 RX ORDER — DEXTROSE 40 %
15-30 GEL (GRAM) ORAL AS NEEDED
Status: DISCONTINUED | OUTPATIENT
Start: 2019-10-11 | End: 2019-10-12 | Stop reason: HOSPADM

## 2019-10-11 RX ORDER — LIDOCAINE HYDROCHLORIDE 10 MG/ML
INJECTION, SOLUTION INFILTRATION; PERINEURAL AS NEEDED
Status: DISCONTINUED | OUTPATIENT
Start: 2019-10-11 | End: 2019-10-11 | Stop reason: SURG

## 2019-10-11 RX ORDER — FENTANYL CITRATE 50 UG/ML
50 INJECTION, SOLUTION INTRAMUSCULAR; INTRAVENOUS
Status: DISCONTINUED | OUTPATIENT
Start: 2019-10-11 | End: 2019-10-11 | Stop reason: HOSPADM

## 2019-10-11 RX ORDER — LABETALOL 300 MG/1
300 TABLET, FILM COATED ORAL 2 TIMES DAILY
Status: DISCONTINUED | OUTPATIENT
Start: 2019-10-11 | End: 2019-10-12 | Stop reason: HOSPADM

## 2019-10-11 RX ORDER — PROPOFOL 10 MG/ML
INJECTION, EMULSION INTRAVENOUS CONTINUOUS PRN
Status: DISCONTINUED | OUTPATIENT
Start: 2019-10-11 | End: 2019-10-11 | Stop reason: SURG

## 2019-10-11 RX ADMIN — HEPARIN SODIUM 8000 UNITS: 1000 INJECTION, SOLUTION INTRAVENOUS; SUBCUTANEOUS at 11:19

## 2019-10-11 RX ADMIN — PREDNISONE 10 MG: 10 TABLET ORAL at 18:10

## 2019-10-11 RX ADMIN — PROTAMINE SULFATE 70 MG: 10 INJECTION, SOLUTION INTRAVENOUS at 12:58

## 2019-10-11 RX ADMIN — DEXTROSE AND SODIUM CHLORIDE: 5; 450 INJECTION, SOLUTION INTRAVENOUS at 15:57

## 2019-10-11 RX ADMIN — HEPARIN SODIUM 3000 UNITS: 1000 INJECTION, SOLUTION INTRAVENOUS; SUBCUTANEOUS at 12:28

## 2019-10-11 RX ADMIN — FENTANYL CITRATE 25 MCG: 50 INJECTION, SOLUTION INTRAMUSCULAR; INTRAVENOUS at 12:14

## 2019-10-11 RX ADMIN — AMLODIPINE BESYLATE 10 MG: 10 TABLET ORAL at 18:10

## 2019-10-11 RX ADMIN — CEFAZOLIN SODIUM 2 G: 1 SOLUTION INTRAVENOUS at 11:05

## 2019-10-11 RX ADMIN — DIVALPROEX SODIUM 250 MG: 250 TABLET, FILM COATED, EXTENDED RELEASE ORAL at 22:07

## 2019-10-11 RX ADMIN — PROPOFOL 125 MCG/KG/MIN: 10 INJECTION, EMULSION INTRAVENOUS at 10:33

## 2019-10-11 RX ADMIN — TRAZODONE HYDROCHLORIDE 50 MG: 50 TABLET ORAL at 22:07

## 2019-10-11 RX ADMIN — LIDOCAINE HYDROCHLORIDE 5 ML: 10 INJECTION, SOLUTION INFILTRATION; PERINEURAL at 10:32

## 2019-10-11 RX ADMIN — SODIUM CHLORIDE: 9 INJECTION, SOLUTION INTRAVENOUS at 10:28

## 2019-10-11 RX ADMIN — FENTANYL CITRATE 50 MCG: 50 INJECTION, SOLUTION INTRAMUSCULAR; INTRAVENOUS at 10:37

## 2019-10-11 RX ADMIN — LISINOPRIL 40 MG: 20 TABLET ORAL at 18:10

## 2019-10-11 RX ADMIN — GLYCOPYRROLATE 0.2 MG: 0.2 INJECTION INTRAMUSCULAR; INTRAVENOUS at 11:55

## 2019-10-11 RX ADMIN — LABETALOL 300 MG: 300 TABLET, FILM COATED ORAL at 20:20

## 2019-10-11 RX ADMIN — FENTANYL CITRATE 25 MCG: 50 INJECTION, SOLUTION INTRAMUSCULAR; INTRAVENOUS at 12:53

## 2019-10-11 RX ADMIN — FAMOTIDINE 40 MG: 20 TABLET ORAL at 18:10

## 2019-10-11 ASSESSMENT — LIFESTYLE VARIABLES: TOBACCO_USE: 1

## 2019-10-11 NOTE — OR SURGEON
Pre-Procedure patient identification:  I am the primary operating surgeon/proceduralist and I have identified the patient and confirmed laterality is left on 10/11/19 at 10:24 AM Gurdeep Guzmán MD [facility]  Phone Number: [phone]

## 2019-10-11 NOTE — OP NOTE
Endovascular repair for type 1B endoleak left iliac. Procedure Note    Procedure:    Endovascular repair for type 1B endoleak left iliac.  CPT(R) Code:  72670 - NC EVASC RPR DPLMNT ILIO-ILIAC NDGFT    Procedure: Left retrograde femoral access; aortogram iliac angiography; repair type Ib endoleak  Pre-op Diagnosis     * Endoleak post (EVAR) endovascular aneurysm repair, subsequent encounter [T82.330D]       Post-op Diagnosis     * Endoleak post (EVAR) endovascular aneurysm repair, subsequent encounter [T82.330D]  Type Ib endoleak  Surgeon(s) and Role:     * Ty Bucio MD - Resident - Assisting     * Gurdeep Guzmán MD FACS - Primary    Anesthesia: General    Staff:   Circulator: Felicia Gilmore RN; Eusebia Duggan RN  Scrub Person: America Marcelo RN; Kathy Wong RN     Findings: Severely tortuous iliac vessels; large type Ib endoleak    Procedure Details   Patient was identified in the PACU in the left lower extremity was marked.  In the operating room conscious sedation was induced and the inguinal regions were prepped with ChloraPrep and draped in the field.  Ultrasound guidance was used to access the left common femoral artery with a micropuncture needle.  A starter wire was inserted and a 6 Bengali sheath was placed.  Angiography was performed showing excellent femoral access.  With difficulty 2 Perclose devices were placed and an 8 Bengali sheath was inserted.  Next at this point the patient was given 8000 units of heparin intravenously and iliac angiography and angiography of the aortic sac was performed.  There was a very large type Ib endoleak.  There was significant difficulty in accessing the open left iliac limb.  Audible wires being soft angled glide stiff angled glide and advantage wire were used with multiple catheters i.e. burn, eventually 5 and Van sheath 3 area a 12 Bengali sheath was then placed and using eventually 3 catheter a soft angled Glidewire was passed into the  right iliac limb and then positioned in the descending thoracic aorta.  Using the inner cannula to a long 7 Khmer sheath was inserted and positioned in the descending thoracic aorta and a Lunderquist wire was placed.  2 iliac limbs were then inserted first being ZS Alley 13-1 22-his ET and the second being CSL the-13-1 07-DVT.  All areas were ballooned with a Salas balloon.  Completion angiography showed excellent flow without evidence of endoleak.  Sheath and wires were removed and the defect was closed with close device.  70 mg of protamine was given.  Patient tolerated procedure well was taken the PACU in good condition.  This is Dr. Guzmán thank you very much.    Estimated Blood Loss: 20 mL    Specimens:                No specimens collected during this procedure.      Drains:      Implants:   Implant Name Type Inv. Item Serial No.  Lot No. LRB No. Used   DEVICE CLOSURE PERCLOSE-PROGLIDE - [id number] Device Vascular Closure DEVICE CLOSURE PERCLOSE-PROGLIDE  [employer] [id number] N/A 2   ZENITH SPIRAL-Z TMAAA ILIAC LEG    [employer] [id number] Left 1   LEG ILIAC ENDO STENT 13 X 107 - [id number]   LEG ILIAC ENDO STENT 13 X 107   [employer] [id number] Left 1              Complications:  None; patient tolerated the procedure well.           Disposition: PACU - hemodynamically stable.           Condition: stable    Gurdeep Guzmán MD [facility]  Phone Number: [phone]    Patient Care Team     Relationship Specialty Notifications Start End   Shoenberger, Douglas C, MD PCP - General   12/30/17     Address:  [facility] [address] [address]

## 2019-10-11 NOTE — BRIEF OP NOTE
Endovascular repair for type 1B endoleak left iliac. Procedure Note    Procedure:    Endovascular repair for type 1B endoleak left iliac.  CPT(R) Code:  78404 - [place] EVASC RPR DPLMNT ILIO-ILIAC NDGFT      Pre-op Diagnosis     * Endoleak post (EVAR) endovascular aneurysm repair, subsequent encounter [T82.330D]       Post-op Diagnosis     * Endoleak post (EVAR) endovascular aneurysm repair, subsequent encounter [T82.330D]    Surgeon(s) and Role:     * Ty Bucio MD - Resident - Assisting     * Gurdeep Guzmán MD FACS - Primary    Anesthesia: General    Staff:   Circulator: Felicia Gilmore RN; Eusebia Duggan RN  Scrub Person: America Marcelo RN; Kathy Wong RN    Procedure Details   Type 1b endoleak repair, left femoral artery percutaneous access, L iliac stent x 2, completion arteriogram    Estimated Blood Loss: 20 mL    Specimens:                No specimens collected during this procedure.      Drains:      Implants:   Implant Name Type Inv. Item Serial No.  Lot No. LRB No. Used   DEVICE CLOSURE PERCLOSE-PROGLIDE - [id number] Device Vascular Closure DEVICE CLOSURE PERCLOSE-PROGLIDE  [employer] [id number] N/A 2   ZENITH SPIRAL-Z TMAAA ILIAC LEG    [employer] [id number] Left 1   LEG ILIAC ENDO STENT 13 X 107 - [id number]   LEG ILIAC ENDO STENT 13 X 107   [employer] [id number] Left 1              Complications:  None; patient tolerated the procedure well.           Disposition: PACU - hemodynamically stable.           Condition: stable    Gurdeep Guzmán MD FACS  Phone Number: [phone]

## 2019-10-11 NOTE — ANESTHESIA PREPROCEDURE EVALUATION
"Anesthesia ROS/MED HX    Anesthesia History - neg  Pulmonary    history of tobacco use (remote hx, 30-40 years ago)  Cardiovascular   hypertension   ECG reviewed  Abnormal ECG  Hematological - neg  GI/Hepatic- neg  Musculoskeletal- neg  Endo/Other- neg  Body Habitus: Normal  ROS/MED HX Comments:    Neurology/Psychology: About 2 years ago, some confusion, memory problems,   \"swollen brain\". No symptoms since then.  Takes prednisone chronically, did take dose this AM.   ECG: Sinus bradycardia  Anterior infarct (cited on or before 25-JUN-2008)  Abnormal ECG  When compared with ECG of 25-JUN-2008 11:26,  No significant change was found  Confirmed by PHYLLIS KIM MD ([id number]) on 9/24/2019 11:13:23 AM   Renal Disease: Single kidney, nephrectomy after sports trauma.      Past Surgical History:   Procedure Laterality Date   • COLECTOMY      diverticulitis   • EYE SURGERY      retinal tear   • HERNIA REPAIR     • NEPHRECTOMY      age 22  for bleeding   • SHOULDER ARTHROSCOPY     • VASCULAR SURGERY  2008    EVAR       Physical Exam    Airway   Mallampati: II   TM distance: >3 FB   Neck ROM: full  Cardiovascular - normal   Rhythm: regular   Rate: normal  Pulmonary - normal   clear to auscultation  Dental - normal        Anesthesia Plan    Plan: MAC    Technique: MAC     Lines and Monitors: PIV and additional IV     Airway: natural airway / supplemental oxygen   ASA 3  Blood Products:     Use of Blood Products Discussed: Yes   Anesthetic plan and risks discussed with: patient  Induction:    intravenous   Postop Plan:   Patient Disposition: inpatient floor planned admission   Pain Management: IV analgesics    "

## 2019-10-11 NOTE — H&P (VIEW-ONLY)
Vascular Surgery H&P  HPI        Patient is a 79 y.o. male who has a past history of endovascular aneurysm repair in July 2008 with [email or web address] system.  He was last seen on 7/16/2018.  He presents at this time for routine follow-up evaluation.  Ultrasound was performed 6/13/2019.  This ultrasound showed potential increase in aneurysm sac size.  He returns for follow-up at this time with CT scan of the abdomen pelvis.  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 No pertinent past medical history.        Surgical History:   Surgical History         Past Surgical History:   Procedure Laterality Date   • VASCULAR SURGERY   2008     EVAR            Social History:   Social History          Social History Narrative   • No narrative on file         Family History: History reviewed. No pertinent family history.     Allergies: Aspirin and Diazepam     Current Medications:  •  amLODIPine  •  divalproex  •  famotidine  •  fenofibrate micronized  •  hydrochlorothiazide  •  ibuprofen  •  labetalol  •  lisinopril  •  mirabegron  •  mycophenolate  •  predniSONE  •  traZODone     Review of Systems  Systemic: No fever, no chills, and no recent weight change. No headache.  Neck: No neck pain, no neck stiffness.  Patient had 3 teeth removed.  Continues to have discomfort from this and is continued to have some swelling and ecchymosis in the neck.  Otolaryngeal: no hoarseness, no dysphagia.   Cardiovascular: No chest pain or discomfort, no palpitations.  Respiratory: No wheezing.  Gastrointestinal: Appetite without significant change. No dysphagia, no active abdominal pain, and no melena. No bright red blood per rectum.  Genitourinary: No hematuria, No genital lesion.  No obvious bladder changes.   Endocrine: No polydipsia and no excessive sweating.  Hematologic: No easy bleeding and no tendency for easy bruising.   Integumentary: No obvious  masses  Musculoskeletal: No new muscle tenderness.  Neurological: No new motor disturbances, or sensory disturbances.  Continues with some imbalance  Psychological: Appropriate.  Skin: No pruritus. No skin lesions and no rash              Objective         Vital Signs for the last 24 hours:  BP: (129)/(71) 129/71     Physicial Exam     General appearance: alert, appears stated age and cooperative  Head: normocephalic, without obvious abnormality, atraumatic  Neck: supple, symmetrical, trachea midline.  There is no JVD.  Carotid arteries have good upstroke without carotid bruits.  There is no lymphadenopathy.  Mild swelling and ecchymosis in the neck  Heart: Regular and rhythmic without murmur gallop.  Lungs: clear to auscultation bilaterally  Chest wall: no tenderness  Back: symmetric, no curvature. ROM normal. No CVA tenderness  Abdomen: soft, non-tender; bowel sounds normal; no masses, no organomegaly.  No hernias.  No intra-abdominal bruits.  Extremities: extremities warm, no cyanosis, clubbing, or edema  Pulses: 2+ and symmetric  Skin: Skin color, texture, turgor normal. No rashes or lesions  Neurologic: Grossly normal           Labs     No new labs.     Imaging  Small stable aneurysm sac with excellent flow through the graft and no evidence of endoleak.  Ultrasound shows potential increase in aortic sac diameter.     CT angiography reveals a type Ib endoleak in the left iliac system.  Assessment and Plan         Problem List Items Addressed This Visit      Endoleak post endovascular aneurysm repair (CMS/[facility]) ([facility]) - Primary     Current Assessment & Plan       79-year-old status post infrarenal aortic aneurysm repair with [employer] system.  Ultrasound revealed enlarging aneurysm sac.  CT scan was performed and this shows a type Ib endoleak.  I feel this can be repaired with an iliac limb.  Discussed with the patient with his risks and benefits.     He will require steroid dosing in the perioperative.                    Gurdeep Guzmán MD, FACS  Chief, Division of Vascular Surgery  [facility]

## 2019-10-11 NOTE — ANESTHESIA POSTPROCEDURE EVALUATION
Patient: Anderson Muñoz    Procedure Summary     Date:  10/11/19 Room / Location:  [facility] OR 15 / [facility] OR    Anesthesia Start:  1028 Anesthesia Stop:  1327    Procedure:  Endovascular repair for type 1B endoleak left iliac. (N/A ) Diagnosis:       Endoleak post (EVAR) endovascular aneurysm repair, subsequent encounter      (Endoleak post (EVAR) endovascular aneurysm repair, subsequent encounter [T82.330D])    Surgeon:  Gurdeep Guzmán MD [facility] Responsible Provider:  Travis Sims MD    Anesthesia Type:  MAC ASA Status:  3          Anesthesia Type: MAC  PACU Vitals  10/11/2019 1315 - 10/11/2019 1415      10/11/2019 1324 10/11/2019 1330 10/11/2019 1345       BP: - 115/65 117/68     Temp: 36.3 °C (97.4 °F) - -     Pulse: - 62 (!)  58     Resp: - 14 16     SpO2: - 99 % 98 %             Anesthesia Post Evaluation    Pain management: satisfactory to patient  Patient location during evaluation: PACU  Patient participation: complete - patient participated  Level of consciousness: awake and alert  Cardiovascular status: acceptable and hemodynamically stable  Airway Patency: adequate and patent  Respiratory status: nonlabored ventilation, acceptable and nasal cannula  Hydration status: stable  Anesthetic complications: no  Comments: No c/o re anes care.

## 2019-10-12 VITALS
DIASTOLIC BLOOD PRESSURE: 59 MMHG | HEIGHT: 74 IN | TEMPERATURE: 98.1 F | BODY MASS INDEX: 27.72 KG/M2 | SYSTOLIC BLOOD PRESSURE: 120 MMHG | OXYGEN SATURATION: 97 % | HEART RATE: 60 BPM | RESPIRATION RATE: 18 BRPM | WEIGHT: 216 LBS

## 2019-10-12 LAB
ANION GAP SERPL CALC-SCNC: 9 MEQ/L (ref 3–15)
BUN SERPL-MCNC: 10 MG/DL (ref 8–20)
CALCIUM SERPL-MCNC: 8.3 MG/DL (ref 8.9–10.3)
CHLORIDE SERPL-SCNC: 97 MEQ/L (ref 98–109)
CO2 SERPL-SCNC: 24 MEQ/L (ref 22–32)
CREAT SERPL-MCNC: 0.9 MG/DL
ERYTHROCYTE [DISTWIDTH] IN BLOOD BY AUTOMATED COUNT: 13.4 % (ref 11.6–14.4)
GFR SERPL CREATININE-BSD FRML MDRD: >60 ML/MIN/1.73M*2
GLUCOSE SERPL-MCNC: 124 MG/DL (ref 70–99)
HCT VFR BLDCO AUTO: 32.2 %
HGB BLD-MCNC: 10.6 G/DL
MCH RBC QN AUTO: 30.5 PG (ref 28–33.2)
MCHC RBC AUTO-ENTMCNC: 32.9 G/DL (ref 32.2–36.5)
MCV RBC AUTO: 92.8 FL (ref 83–98)
PDW BLD AUTO: 10.2 FL (ref 9.4–12.4)
PLATELET # BLD AUTO: 121 K/UL
POTASSIUM SERPL-SCNC: 3.2 MEQ/L (ref 3.6–5.1)
RBC # BLD AUTO: 3.47 M/UL (ref 4.5–5.8)
SODIUM SERPL-SCNC: 130 MEQ/L (ref 136–144)
WBC # BLD AUTO: 8.88 K/UL

## 2019-10-12 PROCEDURE — 63700000 HC SELF-ADMINISTRABLE DRUG: Performed by: STUDENT IN AN ORGANIZED HEALTH CARE EDUCATION/TRAINING PROGRAM

## 2019-10-12 PROCEDURE — 80048 BASIC METABOLIC PNL TOTAL CA: CPT | Performed by: STUDENT IN AN ORGANIZED HEALTH CARE EDUCATION/TRAINING PROGRAM

## 2019-10-12 PROCEDURE — 85027 COMPLETE CBC AUTOMATED: CPT | Performed by: STUDENT IN AN ORGANIZED HEALTH CARE EDUCATION/TRAINING PROGRAM

## 2019-10-12 PROCEDURE — 99024 POSTOP FOLLOW-UP VISIT: CPT | Performed by: SURGERY

## 2019-10-12 PROCEDURE — 63600000 HC DRUGS/DETAIL CODE: Performed by: STUDENT IN AN ORGANIZED HEALTH CARE EDUCATION/TRAINING PROGRAM

## 2019-10-12 PROCEDURE — 63700000 HC SELF-ADMINISTRABLE DRUG: Performed by: PHYSICIAN ASSISTANT

## 2019-10-12 PROCEDURE — 36415 COLL VENOUS BLD VENIPUNCTURE: CPT | Performed by: STUDENT IN AN ORGANIZED HEALTH CARE EDUCATION/TRAINING PROGRAM

## 2019-10-12 PROCEDURE — 25800000 HC PHARMACY IV SOLUTIONS: Performed by: STUDENT IN AN ORGANIZED HEALTH CARE EDUCATION/TRAINING PROGRAM

## 2019-10-12 RX ORDER — CLOPIDOGREL BISULFATE 75 MG/1
75 TABLET ORAL DAILY
Status: DISCONTINUED | OUTPATIENT
Start: 2019-10-12 | End: 2019-10-12 | Stop reason: HOSPADM

## 2019-10-12 RX ORDER — POTASSIUM CHLORIDE 750 MG/1
20 TABLET, FILM COATED, EXTENDED RELEASE ORAL ONCE
Status: COMPLETED | OUTPATIENT
Start: 2019-10-12 | End: 2019-10-12

## 2019-10-12 RX ORDER — CLOPIDOGREL BISULFATE 75 MG/1
75 TABLET ORAL DAILY
Qty: 30 TABLET | Refills: 0 | Status: SHIPPED | OUTPATIENT
Start: 2019-10-13 | End: 2019-11-12

## 2019-10-12 RX ADMIN — ACETAMINOPHEN 650 MG: 325 TABLET, FILM COATED ORAL at 00:43

## 2019-10-12 RX ADMIN — HYDROCHLOROTHIAZIDE 25 MG: 25 TABLET ORAL at 08:30

## 2019-10-12 RX ADMIN — FAMOTIDINE 40 MG: 20 TABLET ORAL at 08:29

## 2019-10-12 RX ADMIN — AMLODIPINE BESYLATE 10 MG: 10 TABLET ORAL at 08:29

## 2019-10-12 RX ADMIN — ASPIRIN 81 MG 81 MG: 81 TABLET ORAL at 08:30

## 2019-10-12 RX ADMIN — LISINOPRIL 40 MG: 20 TABLET ORAL at 08:29

## 2019-10-12 RX ADMIN — POTASSIUM CHLORIDE 20 MEQ: 750 TABLET, FILM COATED, EXTENDED RELEASE ORAL at 10:14

## 2019-10-12 RX ADMIN — CLOPIDOGREL BISULFATE 75 MG: 75 TABLET ORAL at 10:15

## 2019-10-12 RX ADMIN — OXYCODONE HYDROCHLORIDE 5 MG: 5 TABLET ORAL at 00:44

## 2019-10-12 RX ADMIN — HEPARIN SODIUM 5000 UNITS: 5000 INJECTION INTRAVENOUS; SUBCUTANEOUS at 05:45

## 2019-10-12 RX ADMIN — PREDNISONE 10 MG: 10 TABLET ORAL at 08:29

## 2019-10-12 RX ADMIN — DEXTROSE AND SODIUM CHLORIDE 80 ML/HR: 5; 450 INJECTION, SOLUTION INTRAVENOUS at 04:27

## 2019-10-12 NOTE — PROGRESS NOTES
General Surgery Daily Progress Note    Subjective  Patient had urinary retention overnight for which he was straight cathed x 1 with 600cc UOP.  Voiding s/p catheterization.  Reports good pain control.  Tolerated food without abdominal pain, N/V.  Not OOB yet.    Objective     Vital signs in last 24 hours:  Temp:  [36.3 °C (97.4 °F)-37.1 °C (98.7 °F)] 36.7 °C (98.1 °F)  Heart Rate:  [54-74] 60  Resp:  [12-18] 18  BP: (115-153)/(59-79) 120/59      Intake/Output Summary (Last 24 hours) at 10/12/19 1139  Last data filed at 10/12/19 0800   Gross per 24 hour   Intake             1040 ml   Output             2381 ml   Net            -1341 ml     Intake/Output this shift:  I/O this shift:  In: -   Out: 511 [Urine:511]    Physical Exam    General appearance: alert, appears stated age and cooperative  Lungs: no increased WOB  Heart: regular rate and rhythm  Abdomen: soft, non-tender; bowel sounds normal; no masses, no organomegaly  Extremities: extremities normal, warm and well-perfused; no cyanosis, clubbing, or edema  Pulses: 2+ and symmetric  Skin: L groin with small bruising, no hematoma  Neurologic: Grossly normal      Assessment/Plan     This is a 79 y.o. s/p Procedure(s):  Endovascular repair for type 1B endoleak left iliac. for Pre-op Diagnosis     * Endoleak post (EVAR) endovascular aneurysm repair, subsequent encounter [H63.985D]      -regular diet  -hep locked  -SQH/ASA/plavix  -Depakote  -Pain: oxycodone, tylenol, ibuprofen  -OOB  -d/c home if able to ambulate independently     Please page [phone] with any questions or concerns  Kathy Bridges MD

## 2019-10-12 NOTE — NURSING NOTE
IV and tele monitor discontinued.  Pt dressed independently.  Discharge instructions reviewed with pt and spouse.  No questions at this time.  Wheeled to lobby by RN and left in private vehicle with wife.

## 2019-10-12 NOTE — POST-OP (NON-BILLABLE)
General Surgery Post Op Note    Subjective   Pt doing well sp type 1b endoleak repair, left femoral artery percutaneous acces, L iliac stent x2, completion arteriogram. No pain or discomfort. Motor and sensation in tact in lower extremities. L groin access site c/d/i with dermabond over top    Objective   CBC Results       09/24/19                          1152           WBC 6.51           RBC 3.70 (L)           HGB 11.5 (L)           HCT 34.8 (L)           MCV 94.1           MCH 31.1           MCHC 33.0                                    BMP Results       09/24/19 07/29/19                       1152 0723           (L) -          K 3.4 (L) -          Cl 92 (L) -          CO2 26 -          Glucose 90 -          BUN 17 20          Creatinine 1.4 (H) 1.25 (H)          Calcium 9.2 -          Anion Gap 12 -          EGFR 49.1 (L) 55 (L)            64          Comment for Creatinine at 0723 on 07/29/19:  For patients >49 years of age, the reference limit  for Creatinine is approximately 13% higher for people  identified as -American.                 amLODIPine 10 mg oral Daily   [START ON 10/12/2019] aspirin 81 mg oral Daily   divalproex 250 mg oral Nightly   famotidine 40 mg oral Daily   [START ON 10/12/2019] heparin (porcine) 5,000 Units subcutaneous q8h TERI   hydrochlorothiazide 25 mg oral Daily   labetalol 300 mg oral BID   lisinopril 40 mg oral Daily   predniSONE 10 mg oral Daily   traZODone 50 mg oral Nightly       General appearance: alert, appears stated age and cooperative  HEENT: within normal limits  Lungs/Chest wall: effort normal  Heart: normal rate  Abdomen: soft, non-tender; bowel sounds normal; no masses, no organomegaly  Extremities: 2+ DP pulses bilaterally. L groin access site c/d/i without erythema, bruising, hematoma with dermabond    Assessment   This is a 79 y.o. s/p Procedure(s):  Endovascular repair for type 1B endoleak left iliac. for Pre-op Diagnosis     * Endoleak post  (EVAR) endovascular aneurysm repair, subsequent encounter [T82.330D]       Plan   -strict bedrest  -voided 200  -f/u bladder scan  -[id number]  -SQH/ASA on POD1  -Depakote  -Reg diet  -Pain: oxycodone, tylenol, ibuprofen      Please page [phone] with any questions or concerns  Fiordaliza Mccloud, PGY 1

## 2019-10-12 NOTE — DISCHARGE INSTRUCTIONS
You just underwent a endoleak repair with 2 left iliac stents placed.  Please read the following instructions carefully;    Wound Care Instructions:  o You have 2 incisions covered with surgical glue at your groins. The glue will fall off on its own in a few weeks. Please do not peel off. You also have an incision on our abdomen which has staples in it, these will need to be removed in about 2 weeks at follow up, you may cover the incision with gauze to prevent irritation.   o Shower daily   o Clean incision with soap and water. No lotions, creams, perfumes, etc. directly on incision   o No tub baths until incision completely healed in about 4-6 weeks       Avoid Constipation  o Drink plenty of water   o Use over the counter stool softeners (Colace), laxatives (senokot, milk of magnesia), or suppositories (Dulcolax)   o Stop medications if experience diarrhea       Medications:  o Resume all home medications including Plavix, please follow up with your Primary care physician to continue therapy.   o You have been prescribed Percocet, please take 1 tablet every 4-6 hours for pain as needed, Do not drive while taking pain medications.       Reasons to Call the Surgeon:  o Severe and persistent shortness of breath not relieved with rest   o Fever above 101.5 oF   o Redness, swelling or drainage from incisions   o Severe pain, new numbness or tingling in hands or feet  o Legs becomes cold, loose strength or sensation, or have severe pain    Pleas call the office in 2 weeks to set up an appointment.    Dr. Guzmán  [address] [facility] [address]  [place] [address] [phone]

## 2019-10-29 ENCOUNTER — OFFICE VISIT (OUTPATIENT)
Dept: VASCULAR SURGERY | Facility: CLINIC | Age: 79
End: 2019-10-29
Payer: MEDICARE

## 2019-10-29 VITALS — HEART RATE: 67 BPM | DIASTOLIC BLOOD PRESSURE: 82 MMHG | SYSTOLIC BLOOD PRESSURE: 147 MMHG

## 2019-10-29 DIAGNOSIS — I71.30 RUPTURED ABDOMINAL AORTIC ANEURYSM (AAA) (CMS/HCC): Primary | ICD-10-CM

## 2019-10-29 PROCEDURE — 99024 POSTOP FOLLOW-UP VISIT: CPT | Performed by: SURGERY

## 2019-10-29 NOTE — ASSESSMENT & PLAN NOTE
79-year-old gentleman status post treatment of type Ib endoleak in the left lower extremity.  He has done well in the postoperative course and has no complaints.    Recommendation: Follow-up evaluation with aortic ultrasound in 3 months.

## 2019-10-29 NOTE — PROGRESS NOTES
[facility]   [address] [place], [place] [address]B[place]vera [address]  P: [phone]     F: [phone]       Vascular Post Op Visit    DETAILS OF VISIT     Visit Date: 10/29/2019    Patient is a 79 y.o. male who had endovascular repair of a left-sided type Ib endoleak after Ivar on 10/11/2019.  This required placement of 2 Cook iliac limbs Anderson Muñoz presents at this time for initial postoperative evaluation.    He is done very well in the postoperative period and has no complaints.  His ambulation is quite good.  Occasional tenderness in the left inguinal region.      Objective     Vital Signs for this visit:  Visit Vitals  BP (!) 147/82   Pulse 67       PHYSICAL EXAM       Alert and oriented.  Heart was regular rhythm without murmur gallop.  Lungs were clear with good ventilation.  Abdomen is benign.  Inguinal regions show no hematoma and mild tenderness on deep palpation on the left.  Distal pulses are easily palpable.    IMAGING/LABS       No new labs.      No new imaging results.    ASSESSMENT/PLAN     Problem List Items Addressed This Visit        Circulatory    Endoleak post endovascular aneurysm repair (CMS/HCC) - Primary    Current Assessment & Plan     79-year-old gentleman status post treatment of type Ib endoleak in the left lower extremity.  He has done well in the postoperative course and has no complaints.    Recommendation: Follow-up evaluation with aortic ultrasound in 3 months.               Gurdeep Guzmán MD, FACS  Chief, Division of Vascular Surgery  Main [facility]      Thank you very much for allowing us to participate in the care of your patient.  I will keep you informed of Anderson Muñoz's care.  Please not hesitate to call or email if there are any questions.  I can be reached at [phone](mobile) or [email or web address].      Sincerely.    Chad Guzmán

## 2019-10-29 NOTE — LETTER
October 29, 2019     Douglas C Shoenberger, MD  [address] [facility] [address]    Patient: Anderson Muñoz  YOB: 1940  Date of Visit: 10/29/2019      Dear Dr. Shoenberger:    Thank you for referring Anderson Muñoz to me for evaluation. Below are my notes for this consultation.    If you have questions, please do not hesitate to call me. I look forward to following your patient along with you.         Sincerely,        Gurdeep Guzmán MD FACS        CC: No Recipients  Gurdeep Guzmán MD FACS  10/29/2019  4:22 PM  Sign at close encounter     [facility] Vascular Specialist   [address] [place] [address] [place] [address]  P: [phone]     F: [phone]       Vascular Post Op Visit    DETAILS OF VISIT     Visit Date: 10/29/2019    Patient is a 79 y.o. male who had endovascular repair of a left-sided type Ib endoleak after Ivar on 10/11/2019.  This required placement of 2 Cook iliac limbs Anderson Muñoz presents at this time for initial postoperative evaluation.    He is done very well in the postoperative period and has no complaints.  His ambulation is quite good.  Occasional tenderness in the left inguinal region.      Objective     Vital Signs for this visit:  Visit Vitals  BP (!) 147/82   Pulse 67       PHYSICAL EXAM       Alert and oriented.  Heart was regular rhythm without murmur gallop.  Lungs were clear with good ventilation.  Abdomen is benign.  Inguinal regions show no hematoma and mild tenderness on deep palpation on the left.  Distal pulses are easily palpable.    IMAGING/LABS       No new labs.      No new imaging results.    ASSESSMENT/PLAN     Problem List Items Addressed This Visit        Circulatory    Endoleak post endovascular aneurysm repair (CMS/HCC) - Primary    Current Assessment & Plan     79-year-old gentleman status post treatment of type Ib endoleak in the left lower extremity.  He has done well in the postoperative course and has no  complaints.    Recommendation: Follow-up evaluation with aortic ultrasound in 3 months.               Gurdeep Guzmán MD, FACS  Chief, Division of Vascular Surgery  Main [facility]      Thank you very much for allowing us to participate in the care of your patient.  I will keep you informed of Anderson Muñoz's care.  Please not hesitate to call or email if there are any questions.  I can be reached at [phone](mobile) or [email or web address].      Sincerely.    Chad Guzmán

## 2019-12-06 ENCOUNTER — TRANSCRIBE ORDERS (OUTPATIENT)
Dept: ADMINISTRATIVE | Facility: HOSPITAL | Age: 79
End: 2019-12-06

## 2019-12-06 DIAGNOSIS — R94.31 NONSPECIFIC ABNORMAL ELECTROCARDIOGRAM (ECG) (EKG): Primary | ICD-10-CM

## 2019-12-17 ENCOUNTER — HOSPITAL ENCOUNTER (OUTPATIENT)
Dept: NON INVASIVE DIAGNOSTICS | Facility: HOSPITAL | Age: 79
Discharge: HOME | End: 2019-12-17
Payer: COMMERCIAL

## 2019-12-17 DIAGNOSIS — R94.31 NONSPECIFIC ABNORMAL ELECTROCARDIOGRAM (ECG) (EKG): ICD-10-CM

## 2019-12-17 PROCEDURE — 93306 TTE W/DOPPLER COMPLETE: CPT

## 2019-12-17 PROCEDURE — 93306 TTE W/DOPPLER COMPLETE: CPT | Performed by: INTERNAL MEDICINE

## 2020-01-09 ENCOUNTER — OFFICE VISIT (OUTPATIENT)
Dept: VASCULAR SURGERY | Facility: CLINIC | Age: 80
End: 2020-01-09
Payer: MEDICARE

## 2020-01-09 VITALS
SYSTOLIC BLOOD PRESSURE: 120 MMHG | DIASTOLIC BLOOD PRESSURE: 73 MMHG | OXYGEN SATURATION: 97 % | HEART RATE: 58 BPM | RESPIRATION RATE: 17 BRPM

## 2020-01-09 DIAGNOSIS — I72.3 ANEURYSM OF ILIAC ARTERY (CMS/HCC): Primary | ICD-10-CM

## 2020-01-09 PROCEDURE — 99024 POSTOP FOLLOW-UP VISIT: CPT | Performed by: SURGERY

## 2020-01-09 NOTE — PROGRESS NOTES
Vascular Surgery Follow up Visit  HPI     Patient is a 79 y.o. male who has a past history of endovascular aneurysm repair in July 2008 with [employer] system.  He was last seen on 7/16/2018. 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    Medical History:   Past Medical History:   Diagnosis Date   • CNS vasculitis (CMS/HCC)     cerebral amyloid angiopathy; prednisone   • Hypertension        Surgical History:   Past Surgical History:   Procedure Laterality Date   • COLECTOMY      diverticulitis   • EYE SURGERY      retinal tear   • HERNIA REPAIR     • NEPHRECTOMY      age 22  for bleeding   • SHOULDER ARTHROSCOPY     • VASCULAR SURGERY  2008    EVAR   • VASCULAR SURGERY  10/11/2019     Endovascular repair for type 1B endoleak left iliac.       Social History:   Social History     Social History Narrative   • Not on file       Family History:   Family History   Problem Relation Age of Onset   • Melanoma Biological Mother    • Hypertension Biological Father    • Colon cancer Biological Brother        Allergies: Diazepam    Current Medications:  •  alendronate sodium (ALENDRONATE ORAL)  •  amLODIPine  •  clopidogrel  •  divalproex  •  famotidine  •  fenofibrate  •  hydrochlorothiazide  •  labetalol  •  lisinopril  •  mirabegron  •  MYCOPHENOLATE MOFETIL ORAL  •  predniSONE  •  traZODone    Review of Systems  Systemic: No fever, no chills, and no recent weight change. No headache.  Neck: No neck pain, no neck stiffness.  Patient had 3 teeth removed.  Continues to have discomfort from this and is continued to have some swelling and ecchymosis in the neck.  Otolaryngeal: no hoarseness, no dysphagia.   Cardiovascular: No chest pain or discomfort, no palpitations.  Respiratory: No wheezing.  Gastrointestinal: Appetite without significant change. No dysphagia, no active abdominal pain, and no melena. No bright red blood per rectum.  Genitourinary: No hematuria, No genital lesion.  No obvious  bladder changes.   Endocrine: No polydipsia and no excessive sweating.  Hematologic: No easy bleeding and no tendency for easy bruising.   Integumentary: No obvious masses  Musculoskeletal: No new muscle tenderness.  Neurological: No new motor disturbances, or sensory disturbances.  Continues with some imbalance  Psychological: Appropriate.  Skin: No pruritus. No skin lesions and no rash        Objective     Vital Signs for the last 24 hours:       Physicial Exam    General appearance: alert, appears stated age and cooperative  Head: normocephalic, without obvious abnormality, atraumatic  Neck: supple, symmetrical, trachea midline.  There is no JVD.  Carotid arteries have good upstroke without carotid bruits.  There is no lymphadenopathy.  Mild swelling and ecchymosis in the neck  Heart: Regular and rhythmic without murmur gallop.  Lungs: clear to auscultation bilaterally  Chest wall: no tenderness  Back: symmetric, no curvature. ROM normal. No CVA tenderness  Abdomen: soft, non-tender; bowel sounds normal; no masses, no organomegaly.  No hernias.  No intra-abdominal bruits.  Extremities: extremities warm, no cyanosis, clubbing, or edema  Pulses: 2+ and symmetric  Skin: Skin color, texture, turgor normal. No rashes or lesions  Neurologic: Grossly normal        Labs    No new labs.    Imaging  Small stable aneurysm sac with excellent flow through the graft and no evidence of endoleak.  Ultrasound shows potential increase in aortic sac diameter.    CT angiography reveals a type Ib endoleak in the left iliac system.  Assessment and Plan    Problem List Items Addressed This Visit        Circulatory    Endoleak post endovascular aneurysm repair (CMS/HCC)    Current Assessment & Plan     79-year-old gentleman with history of type Ib endoleak treated with iliac limb extension.  He is doing quite well.    Patient did not have aortic ultrasound as requested.  On exam he is doing well without any evidence of aortic  issues.    Recommendation: Obtain aortic ultrasound at this time.  As he has moved to the [facility] I will attempt to find a vascular surgeon for follow-up for this gentleman.           Other Visit Diagnoses     Aneurysm of iliac artery (CMS/HCC)    -  Primary    Relevant Orders    Ultrasound abdominal aorta          Gurdeep Guzmán MD, FACS  Chief, Division of Vascular Surgery  [facility]      Thank you very much for allowing us to participate in the care of your patient.  I will keep you informed ofhis care.  Please not hesitate to call or email if there are any questions.  I can be reached at [phone](mobile) or [email or web address].  Sincerely.    Chad Guzmán

## 2020-01-09 NOTE — LETTER
January 10, 2020     Douglas C Shoenberger, MD  [address] [facility] [address]    Patient: Anderson Muñoz  YOB: 1940  Date of Visit: 1/9/2020      Dear Dr. Shoenberger:    Thank you for referring Anderson Muñoz to me for evaluation. Below are my notes for this consultation.    If you have questions, please do not hesitate to call me. I look forward to following your patient along with you.         Sincerely,        Gurdeep Guzmán MD FACS        CC: No Recipients  Gurdeep Guzmán MD FACS  1/10/2020 12:13 PM  Sign at close encounter  Vascular Surgery Follow up Visit  HPI     Patient is a 79 y.o. male who has a past history of endovascular aneurysm repair in July 2008 with [employer] system.  He was last seen on 7/16/2018. 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    Medical History:   Past Medical History:   Diagnosis Date   • CNS vasculitis (CMS/HCC)     cerebral amyloid angiopathy; prednisone   • Hypertension        Surgical History:   Past Surgical History:   Procedure Laterality Date   • COLECTOMY      diverticulitis   • EYE SURGERY      retinal tear   • HERNIA REPAIR     • NEPHRECTOMY      age 22  for bleeding   • SHOULDER ARTHROSCOPY     • VASCULAR SURGERY  2008    EVAR   • VASCULAR SURGERY  10/11/2019     Endovascular repair for type 1B endoleak left iliac.       Social History:   Social History     Social History Narrative   • Not on file       Family History:   Family History   Problem Relation Age of Onset   • Melanoma Biological Mother    • Hypertension Biological Father    • Colon cancer Biological Brother        Allergies: Diazepam    Current Medications:  •  alendronate sodium (ALENDRONATE ORAL)  •  amLODIPine  •  clopidogrel  •  divalproex  •  famotidine  •  fenofibrate  •  hydrochlorothiazide  •  labetalol  •  lisinopril  •  mirabegron  •  MYCOPHENOLATE MOFETIL ORAL  •  predniSONE  •  traZODone    Review of Systems  Systemic: No fever,  no chills, and no recent weight change. No headache.  Neck: No neck pain, no neck stiffness.  Patient had 3 teeth removed.  Continues to have discomfort from this and is continued to have some swelling and ecchymosis in the neck.  Otolaryngeal: no hoarseness, no dysphagia.   Cardiovascular: No chest pain or discomfort, no palpitations.  Respiratory: No wheezing.  Gastrointestinal: Appetite without significant change. No dysphagia, no active abdominal pain, and no melena. No bright red blood per rectum.  Genitourinary: No hematuria, No genital lesion.  No obvious bladder changes.   Endocrine: No polydipsia and no excessive sweating.  Hematologic: No easy bleeding and no tendency for easy bruising.   Integumentary: No obvious masses  Musculoskeletal: No new muscle tenderness.  Neurological: No new motor disturbances, or sensory disturbances.  Continues with some imbalance  Psychological: Appropriate.  Skin: No pruritus. No skin lesions and no rash        Objective     Vital Signs for the last 24 hours:       Physicial Exam    General appearance: alert, appears stated age and cooperative  Head: normocephalic, without obvious abnormality, atraumatic  Neck: supple, symmetrical, trachea midline.  There is no JVD.  Carotid arteries have good upstroke without carotid bruits.  There is no lymphadenopathy.  Mild swelling and ecchymosis in the neck  Heart: Regular and rhythmic without murmur gallop.  Lungs: clear to auscultation bilaterally  Chest wall: no tenderness  Back: symmetric, no curvature. ROM normal. No CVA tenderness  Abdomen: soft, non-tender; bowel sounds normal; no masses, no organomegaly.  No hernias.  No intra-abdominal bruits.  Extremities: extremities warm, no cyanosis, clubbing, or edema  Pulses: 2+ and symmetric  Skin: Skin color, texture, turgor normal. No rashes or lesions  Neurologic: Grossly normal        Labs    No new labs.    Imaging  Small stable aneurysm sac with excellent flow through the graft  and no evidence of endoleak.  Ultrasound shows potential increase in aortic sac diameter.    CT angiography reveals a type Ib endoleak in the left iliac system.  Assessment and Plan    Problem List Items Addressed This Visit        Circulatory    Endoleak post endovascular aneurysm repair (CMS/HCC)    Current Assessment & Plan     79-year-old gentleman with history of type Ib endoleak treated with iliac limb extension.  He is doing quite well.    Patient did not have aortic ultrasound as requested.  On exam he is doing well without any evidence of aortic issues.    Recommendation: Obtain aortic ultrasound at this time.  As he has moved to the [facility] I will attempt to find a vascular surgeon for follow-up for this gentleman.           Other Visit Diagnoses     Aneurysm of iliac artery (CMS/HCC)    -  Primary    Relevant Orders    Ultrasound abdominal aorta          Gurdeep Guzmán MD, FACS  Chief, Division of Vascular Surgery  [facility]      Thank you very much for allowing us to participate in the care of your patient.  I will keep you informed ofhis care.  Please not hesitate to call or email if there are any questions.  I can be reached at [phone](mobile) or [email or web address].  Sincerely.    Chad Guzmán

## 2020-01-10 NOTE — ASSESSMENT & PLAN NOTE
79-year-old gentleman with history of type Ib endoleak treated with iliac limb extension.  He is doing quite well.    Patient did not have aortic ultrasound as requested.  On exam he is doing well without any evidence of aortic issues.    Recommendation: Obtain aortic ultrasound at this time.  As he has moved to the [facility] I will attempt to find a vascular surgeon for follow-up for this gentleman.

## 2020-01-16 ENCOUNTER — TRANSCRIBE ORDERS (OUTPATIENT)
Dept: ADMINISTRATIVE | Facility: HOSPITAL | Age: 80
End: 2020-01-16

## 2020-01-16 DIAGNOSIS — I72.3 ANEURYSM OF ILIAC ARTERY (HCC): Primary | ICD-10-CM

## 2020-01-21 ENCOUNTER — HOSPITAL ENCOUNTER (OUTPATIENT)
Dept: ULTRASOUND IMAGING | Facility: HOSPITAL | Age: 80
Discharge: HOME | End: 2020-01-21
Payer: COMMERCIAL

## 2020-01-21 DIAGNOSIS — I72.3 ANEURYSM OF ILIAC ARTERY (HCC): ICD-10-CM

## 2020-01-21 PROCEDURE — 76775 US EXAM ABDO BACK WALL LIM: CPT

## 2020-01-27 ENCOUNTER — TRANSCRIBE ORDERS (OUTPATIENT)
Dept: ADMINISTRATIVE | Facility: HOSPITAL | Age: 80
End: 2020-01-27

## 2020-01-27 ENCOUNTER — TELEPHONE (OUTPATIENT)
Dept: VASCULAR SURGERY | Facility: CLINIC | Age: 80
End: 2020-01-27

## 2020-01-27 DIAGNOSIS — I71.4 ABDOMINAL AORTIC ANEURYSM WITHOUT RUPTURE (HCC): Primary | ICD-10-CM

## 2020-01-27 NOTE — TELEPHONE ENCOUNTER
Patient had ultrasounds done outside of [facility] and is having reports faxed to C office. Said they were told they would get a call to review results once we have them. Please reach back at [phone]

## 2020-01-29 ENCOUNTER — HOSPITAL ENCOUNTER (OUTPATIENT)
Dept: CT IMAGING | Facility: HOSPITAL | Age: 80
Discharge: HOME | End: 2020-01-29
Payer: COMMERCIAL

## 2020-01-29 DIAGNOSIS — I71.4 ABDOMINAL AORTIC ANEURYSM WITHOUT RUPTURE (HCC): ICD-10-CM

## 2020-01-29 PROCEDURE — 74175 CTA ABDOMEN W/CONTRAST: CPT

## 2020-01-29 RX ADMIN — IOHEXOL 100 ML: 350 INJECTION, SOLUTION INTRAVENOUS at 11:36

## 2020-02-03 ENCOUNTER — TELEPHONE (OUTPATIENT)
Dept: VASCULAR SURGERY | Facility: CLINIC | Age: 80
End: 2020-02-03

## 2020-02-03 ENCOUNTER — CONSULT (OUTPATIENT)
Dept: VASCULAR SURGERY | Facility: CLINIC | Age: 80
End: 2020-02-03
Payer: COMMERCIAL

## 2020-02-03 VITALS
TEMPERATURE: 98.4 F | SYSTOLIC BLOOD PRESSURE: 130 MMHG | HEART RATE: 63 BPM | DIASTOLIC BLOOD PRESSURE: 70 MMHG | WEIGHT: 221 LBS | HEIGHT: 73 IN | BODY MASS INDEX: 29.29 KG/M2

## 2020-02-03 DIAGNOSIS — IMO0002 ENDOLEAK OF AORTIC GRAFT: Primary | ICD-10-CM

## 2020-02-03 PROCEDURE — 99205 OFFICE O/P NEW HI 60 MIN: CPT | Performed by: SURGERY

## 2020-02-03 RX ORDER — ALENDRONATE SODIUM 70 MG/1
70 TABLET ORAL
COMMUNITY
Start: 2019-08-08

## 2020-02-03 NOTE — LETTER
February 3, 2020     MD Bruce Rodríguez [address]  [address]     Patient: Kristie Mullen   YOB: 1940   Date of Visit: 2/3/2020       Dear Dr Madhav Elizondo: Thank you for referring Piotr Gtz to me for evaluation  Below are the relevant portions of my assessment and plan of care  Mr Abimael Harrell is a pleasant 17-year-old gentleman who presents for initial consultation  Patient carries a diagnosis of abdominal aortic aneurysm, repaired endovascularly in 2008 with subsequent Ib endoleak noted on CT in August of 2019 repaired with graft limb extension to the left external iliac artery and placement of an Amplatzer plug to the left hypogastric artery, performed 10/06/2019 at [facility]  Patient also carries diagnosis of  CNS and vasculitis followed by Rheumatology at [facility]  Most recent CTA was performed on 01/29/2020 which revealed the aneurysm to measure 7 cm in maximal transverse diameter  The aneurysm measured 6 8 cm in maximal transverse diameter on CTA performed 08/06/2019  The aneurysm at time of repair in 2008 measured 4 8 cm  The aneurysm appears to be a bilobed aneurysm with dilatation proximally at the level of the renal arteries then again at the distal aorta  Of concern is the presence of what appears to be a type Ia endoleak at the pararenal aneurysmal segment  There appears to be good fixation of the graft between the 2 areas of aneurysmal dilatation  There does appear to be a type 2 endoleak at the distal aneurysmal segment  The previous subtantial left type Ib endoleak is no longer present  Of note, there appeared to be the type 1A endoleak at the August CTA as well  The graft is a Cook Zenith graft  The patient does not complain of abdominal pain   Patient underwent left nephrectomy in the remote past   In addition to the left nephrectomy patient has undergone colon resection for diverticulitis via midline incision, (unclear as to the extent of resection)  I have discussed these findings with the patient  He wishes to avoid at all cost, explant of the the endo graft with open aneurysm repair  Due to his previous surgery and the pararenal nature of the aneurysm, this repair would carry significant potential morbidity and mortality  However, since there does not appear to be significant increase in size of the aneurysm since August, we will repeat a CTA and 6 months period  Patient and his accompanying wife are somewhat anxious and wished to undergo the CTA sooner  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, with what appears to be a type Ia endoleak and a more distal aneurysmal component, with what appears to be a type 2 endoleak  The more distal aneurysm measures 7 cm(1/29/20) in maximal transverse diameter  Previous CTA in August 2019 revealed the aneurysm to measure 6 8 cm in maximal transverse diameter  At that time there was presence of a substantial type Ib endoleak, which has since been repaired at an outside institution with graft limb extension to the left external iliac artery and Amplatzer plug to the left hypogastric artery,  in October 2019  Of note, there appeared to be the type 1A endoleak at the August CTA as well  Patient has undergone both nephrectomy and colon resection  Because there has been no significant increase in size from the CT scan performed in August, my preference would be continued observation at this point  We will obtain a repeat CTA in 3-6 months  I have instructed the patient and his accompanying wife to avoid lifting anything greater than 10 lb  Patient and wife expressed understanding and willingness to comply with all recommendations  All questions were answered in the office today regarding his complex aneurysmal disease  If you have questions, please do not hesitate to call me   I look forward to following Eda Castellanos along with you           Sincerely,        Bharath Dimas MD        CC: No Recipients

## 2020-02-03 NOTE — ASSESSMENT & PLAN NOTE
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 component, with what appears to be a type Ia endoleak and a more distal aneurysmal component, with what appears to be a type 2 endoleak  The more distal aneurysm measures 7 cm(1/29/20) in maximal transverse diameter  Previous CTA in August 2019 revealed the aneurysm to measure 6 8 cm in maximal transverse diameter  At that time there was presence of a type Ib endoleak which has since been repaired at an outside institution with graft limb extension to the external iliac artery and Amplatzer plug to the hypogastric artery,  in October 2019  Patient has undergone both nephrectomy and colon resection  Because there has been no significant increase in size from the CT scan performed in August, my preference would be continued observation at this point  We will obtain a repeat CTA in 3-6 months  I have instructed the patient and his accompanying wife to avoid lifting anything greater than 10 lb  Patient and wife expressed understanding and willingness to comply with all recommendations  All questions were answered in the office today regarding his complex aneurysmal disease

## 2020-02-03 NOTE — LETTER
February 3, 2020     Patient: Zuly Wang   YOB: 1940   Date of Visit: 2/3/2020       To Whom it May Concern:    Pawan Medina is under my professional care  He was seen in my office on 2/3/2020  He may return to work on 2/3/20  Absolute restriction from lifting more than 10lbs  If you have any questions or concerns, please don't hesitate to call           Sincerely,          Jignesh Parra MD        CC: No Recipients

## 2020-02-03 NOTE — TELEPHONE ENCOUNTER
Pt saw Dr Olman Brito in the office today and forgot to ask him if he can go to the gym and if so, does he have any restrictions? Instructed that I would send this to Dr Olman Brito to advise

## 2020-02-03 NOTE — PROGRESS NOTES
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, with what appears to be a type Ia endoleak and a more distal aneurysmal component, with what appears to be a type 2 endoleak  The more distal aneurysm measures 7 cm(1/29/20) in maximal transverse diameter  Previous CTA in August 2019 revealed the aneurysm to measure 6 8 cm in maximal transverse diameter  At that time there was presence of a type Ib endoleak which has since been repaired at an outside institution with graft limb extension to the external iliac artery and Amplatzer plug to the hypogastric artery,  in October 2019  Patient has undergone both nephrectomy and colon resection  Because there has been no significant increase in size from the CT scan performed in August, my preference would be continued observation at this point  We will obtain a repeat CTA in 3-6 months  I have instructed the patient and his accompanying wife to avoid lifting anything greater than 10 lb  Patient and wife expressed understanding and willingness to comply with all recommendations  All questions were answered in the office today regarding his complex aneurysmal disease  Diagnoses and all orders for this visit:    Endoleak of aortic graft ([address] )  -     CTA abdomen pelvis w wo contrast; Future  -     Basic metabolic panel; Future    Other orders  -     alendronate (FOSAMAX) 70 mg tablet; Take 70 mg by mouth every 7 days          Subjective:      Patient ID: Lorena Zee is a 70 y o  male  80-year-old gentleman presents for initial consultation  Patient carries a diagnosis of abdominal aortic aneurysm repaired endovascularly in 2008 with subsequent Ib endoleak noted on CT in August of 2019 repaired with graft limb extension to the left external iliac artery and placement of an Amplatzer plug to the left hypogastric artery, performed 10/06/2019 at [facility]    Patient also carries diagnosis of  CNS and vasculitis followed by Rheumatology at [facility]  Most recent CTA was performed on 01/29/2020 which revealed the aneurysm to measure 7 cm in maximal transverse diameter  The aneurysm measured 6 8 cm in maximal transverse diameter on CTA performed 08/06/2019  The aneurysm at time of repair in 2008 measured 4 8 cm  The aneurysm appears to be a bilobed aneurysm with dilatation proximally at the level of the renal arteries then again at the distal aorta  Of concern is the presence of what appears to be a type Ia endoleak at the pararenal aneurysmal segment  There appears to be good fixation of the graft between the 2 areas of aneurysmal dilatation  There does appear to be a type 2 endoleak at the distal aneurysmal segment  The previous subtantial left type Ib endoleak is no longer present  Of note, there appeared to be the type 1A endoleak at the August CTA as well  The graft is a [employer] Zenith graft  The patient does not complain of abdominal pain  Patient underwent left nephrectomy in the remote past   In addition to the left nephrectomy patient has undergone colon resection for diverticulitis via midline incision, (unclear as to the extent of resection)  I have discussed these findings with the patient  He wishes to avoid at all cost, explant of the the endo graft with open aneurysm repair  Due to his previous surgery and the pararenal nature of the aneurysm, this repair would carry significant potential morbidity and mortality  However, since there does not appear to be significant increase in size of the aneurysm since August, we will repeat a CTA and 6 months period  Patient and his accompanying wife are somewhat anxious and wished to undergo the CTA sooner          The following portions of the patient's history were reviewed and updated as appropriate: allergies, current medications, past family history, past medical history, past social history, past surgical history and problem list     Review of Systems      Objective:      /70 (BP Location: Left arm, Patient Position: Sitting, Cuff Size: Adult)   Pulse 63   Temp 98 4 °F (36 9 °C) (Tympanic)   Ht 6' 1" (1 854 m)   Wt 100 kg (221 lb)   BMI 29 16 kg/m²          Physical Exam   Constitutional: He is oriented to person, place, and time  He appears well-developed and well-nourished  HENT:   Head: Normocephalic and atraumatic  Mouth/Throat: Oropharynx is clear and moist    Eyes: Pupils are equal, round, and reactive to light  Conjunctivae and EOM are normal    Neck: Normal range of motion  Neck supple  No JVD present  Cardiovascular: Normal rate, regular rhythm and normal heart sounds  Equal femoral pulses bilaterally  No evidence of popliteal artery aneurysm by examination  Pulmonary/Chest: Effort normal and breath sounds normal  No stridor  No respiratory distress  He has no wheezes  He has no rales  He exhibits no tenderness  Abdominal: Soft  He exhibits no mass  There is no rebound and no guarding  No abdominal tenderness overlying aneurysm  No pulsatility noted of the aneurysm sac  Musculoskeletal: Normal range of motion  He exhibits no tenderness or deformity  Neurological: He is alert and oriented to person, place, and time  Skin: Skin is warm and dry  No rash noted  No erythema  Psychiatric: He has a normal mood and affect  His behavior is normal  Thought content normal    Nursing note and vitals reviewed

## 2020-02-03 NOTE — PROGRESS NOTES
Assessment/Plan:    No problem-specific Assessment & Plan notes found for this encounter  {Assess/PlanSmartLinks:33932}      Subjective:      Patient ID: Patrice Barraza is a 70 y o  male  New patient referred by Dr Mely Garcia  Patient had an EVAR done on 10/11/2019 at [facility]  Patient had CTA Abdominal done 1/29/2020  Patient denies any abdominal, back, or side pain  Patient denies any change in appetite  Patient denies any pain or discomfort after eating  HPI    {Common ambulatory SmartLinks:38215}    Review of Systems   Constitutional: Negative  Negative for appetite change  HENT: Negative  Eyes: Negative  Respiratory: Negative  Cardiovascular: Negative  Gastrointestinal: Negative  Negative for abdominal pain  Endocrine: Negative  Genitourinary: Negative  Negative for flank pain  Musculoskeletal: Negative  Negative for back pain  Skin: Negative  Allergic/Immunologic: Negative  Neurological: Negative  Hematological: Negative  Psychiatric/Behavioral: Negative  Objective: There were no vitals taken for this visit           Physical Exam

## 2020-06-03 ENCOUNTER — TRANSCRIBE ORDERS (OUTPATIENT)
Dept: ADMINISTRATIVE | Facility: HOSPITAL | Age: 80
End: 2020-06-03

## 2020-06-03 DIAGNOSIS — I77.6 ARTERITIS, UNSPECIFIED (HCC): Primary | ICD-10-CM

## 2020-06-25 DIAGNOSIS — R35.1 NOCTURIA: ICD-10-CM

## 2020-06-25 RX ORDER — HYDROCHLOROTHIAZIDE 25 MG/1
TABLET ORAL
Qty: 90 TABLET | Refills: 3 | Status: SHIPPED | OUTPATIENT
Start: 2020-06-25

## 2020-07-24 DIAGNOSIS — N32.81 OVERACTIVE BLADDER: ICD-10-CM

## 2020-07-24 RX ORDER — MIRABEGRON 50 MG/1
TABLET, FILM COATED, EXTENDED RELEASE ORAL
Qty: 30 TABLET | Refills: 11 | Status: SHIPPED | OUTPATIENT
Start: 2020-07-24

## 2020-10-01 ENCOUNTER — HOSPITAL ENCOUNTER (OUTPATIENT)
Dept: MRI IMAGING | Facility: HOSPITAL | Age: 80
Discharge: HOME | End: 2020-10-01
Payer: COMMERCIAL

## 2020-10-01 ENCOUNTER — TRANSCRIBE ORDERS (OUTPATIENT)
Dept: ADMINISTRATIVE | Facility: HOSPITAL | Age: 80
End: 2020-10-01

## 2020-10-01 DIAGNOSIS — I77.6 ARTERITIS, UNSPECIFIED (HCC): ICD-10-CM

## 2020-10-01 PROCEDURE — 70551 MRI BRAIN STEM W/O DYE: CPT

## 2020-10-07 ENCOUNTER — ANESTHESIA EVENT (OUTPATIENT)
Dept: MRI IMAGING | Facility: HOSPITAL | Age: 80
End: 2020-10-07

## 2020-10-07 ENCOUNTER — ANESTHESIA (OUTPATIENT)
Dept: MRI IMAGING | Facility: HOSPITAL | Age: 80
End: 2020-10-07

## 2020-10-07 ENCOUNTER — HOSPITAL ENCOUNTER (OUTPATIENT)
Dept: MRI IMAGING | Facility: HOSPITAL | Age: 80
Discharge: HOME | End: 2020-10-07
Payer: COMMERCIAL

## 2020-10-07 VITALS
RESPIRATION RATE: 18 BRPM | SYSTOLIC BLOOD PRESSURE: 106 MMHG | DIASTOLIC BLOOD PRESSURE: 71 MMHG | OXYGEN SATURATION: 94 % | HEART RATE: 57 BPM | HEIGHT: 73 IN | TEMPERATURE: 97.4 F | WEIGHT: 221 LBS | BODY MASS INDEX: 29.29 KG/M2

## 2020-10-07 VITALS — HEART RATE: 55 BPM

## 2020-10-07 DIAGNOSIS — I63.9 IMPENDING CEREBROVASCULAR ACCIDENT (HCC): ICD-10-CM

## 2020-10-07 PROBLEM — N18.30 CHRONIC RENAL IMPAIRMENT, STAGE 3 (MODERATE) (HCC): Status: ACTIVE | Noted: 2020-10-07

## 2020-10-07 PROBLEM — I68.0 CEREBRAL AMYLOID ANGIOPATHY (CODE): Status: ACTIVE | Noted: 2020-10-07

## 2020-10-07 PROBLEM — F40.240 CLAUSTROPHOBIA: Status: ACTIVE | Noted: 2020-10-07

## 2020-10-07 PROBLEM — I77.6 CNS VASCULITIS (HCC): Status: ACTIVE | Noted: 2020-10-07

## 2020-10-07 PROCEDURE — 70553 MRI BRAIN STEM W/O & W/DYE: CPT

## 2020-10-07 PROCEDURE — A9585 GADOBUTROL INJECTION: HCPCS | Performed by: RADIOLOGY

## 2020-10-07 RX ORDER — PROPOFOL 10 MG/ML
INJECTION, EMULSION INTRAVENOUS CONTINUOUS PRN
Status: DISCONTINUED | OUTPATIENT
Start: 2020-10-07 | End: 2020-10-07

## 2020-10-07 RX ORDER — LIDOCAINE HYDROCHLORIDE 10 MG/ML
INJECTION, SOLUTION EPIDURAL; INFILTRATION; INTRACAUDAL; PERINEURAL AS NEEDED
Status: DISCONTINUED | OUTPATIENT
Start: 2020-10-07 | End: 2020-10-07

## 2020-10-07 RX ORDER — FENTANYL CITRATE 50 UG/ML
INJECTION, SOLUTION INTRAMUSCULAR; INTRAVENOUS AS NEEDED
Status: DISCONTINUED | OUTPATIENT
Start: 2020-10-07 | End: 2020-10-07

## 2020-10-07 RX ORDER — SODIUM CHLORIDE 9 MG/ML
125 INJECTION, SOLUTION INTRAVENOUS CONTINUOUS
Status: DISCONTINUED | OUTPATIENT
Start: 2020-10-07 | End: 2020-10-11 | Stop reason: HOSPADM

## 2020-10-07 RX ORDER — PROPOFOL 10 MG/ML
INJECTION, EMULSION INTRAVENOUS AS NEEDED
Status: DISCONTINUED | OUTPATIENT
Start: 2020-10-07 | End: 2020-10-07

## 2020-10-07 RX ADMIN — GADOBUTROL 10 ML: 604.72 INJECTION INTRAVENOUS at 10:05

## 2020-10-07 RX ADMIN — FENTANYL CITRATE 50 MCG: 50 INJECTION INTRAMUSCULAR; INTRAVENOUS at 09:40

## 2020-10-07 RX ADMIN — FENTANYL CITRATE 50 MCG: 50 INJECTION INTRAMUSCULAR; INTRAVENOUS at 09:43

## 2020-10-07 RX ADMIN — PROPOFOL 20 MG: 10 INJECTION, EMULSION INTRAVENOUS at 09:43

## 2020-10-07 RX ADMIN — PROPOFOL 75 MCG/KG/MIN: 10 INJECTION, EMULSION INTRAVENOUS at 09:43

## 2020-10-07 RX ADMIN — LIDOCAINE HYDROCHLORIDE 50 MG: 10 INJECTION, SOLUTION EPIDURAL; INFILTRATION; INTRACAUDAL; PERINEURAL at 09:43

## 2020-10-07 RX ADMIN — SODIUM CHLORIDE: 0.9 INJECTION, SOLUTION INTRAVENOUS at 09:35

## 2020-10-09 ENCOUNTER — TELEPHONE (OUTPATIENT)
Dept: UROLOGY | Facility: MEDICAL CENTER | Age: 80
End: 2020-10-09

## 2020-10-12 DIAGNOSIS — R35.1 NOCTURIA: Primary | ICD-10-CM

## 2020-10-12 DIAGNOSIS — N32.81 OVERACTIVE BLADDER: ICD-10-CM

## 2020-10-12 DIAGNOSIS — N13.8 BPH WITH URINARY OBSTRUCTION: ICD-10-CM

## 2020-10-12 DIAGNOSIS — N40.1 BPH WITH URINARY OBSTRUCTION: ICD-10-CM

## (undated) DEVICE — Device

## (undated) DEVICE — GUIDEWIRE REGULAR STANDARD ANGLE TIP 035 DIAMETER 260 CM LON

## (undated) DEVICE — BAG DECANTER

## (undated) DEVICE — GUIDEWIRE LUNDERQUIST EXTRA STIFF(MIN ORDER 5)

## (undated) DEVICE — GUIDEWIRE  THSCF3526015ROSN

## (undated) DEVICE — CATH SUPPORT CXI .035 4.0F ST 135CM

## (undated) DEVICE — SHEATH PINNACLE 7FR

## (undated) DEVICE — PACK OR TOWEL

## (undated) DEVICE — SHEATH FLEXO ANSEL 12FR 55CM MP

## (undated) DEVICE — TUBING HIGH PRESSURE

## (undated) DEVICE — GUIDEWIRE J CURVED FIXED CORE STARTER 3MM

## (undated) DEVICE — SHEATH PINNACLE 8FR

## (undated) DEVICE — GLOVE 8 PROTEXIS PI MICRO

## (undated) DEVICE — STAPLER SKIN

## (undated) DEVICE — GOWN SURGICAL REINFORCED X-LAR

## (undated) DEVICE — DRAPE 3/4 REINFORCED

## (undated) DEVICE — CATHETER  CALIBRATED PIGTAIL

## (undated) DEVICE — SHEATH 5FR 10CM PINNACLE INTRO

## (undated) DEVICE — ADHESIVE SKIN DERMABOND ADVANCED 0.7ML

## (undated) DEVICE — SYRINGE DISP LUER-LOK 20 CC

## (undated) DEVICE — APPLICATOR CHLORAPREP 26ML ORANGE TINT

## (undated) DEVICE — KIT CATH LAB ANGIO

## (undated) DEVICE — GLIDEWIRE ADVANTAGE 260CM

## (undated) DEVICE — CHECK-FLO INTRODUCER 14FRX30 LONG

## (undated) DEVICE — STOPCOCK 3WAY HIGH PRESSURE

## (undated) DEVICE — CATH SUPPORT .035 X 90CM

## (undated) DEVICE — CATH BALLOON CODA 9FR X 120CM 32MM DIA

## (undated) DEVICE — KIT COAXIAL MICROINTRODUCER 4FR X 10CM

## (undated) DEVICE — SUTURE BOOTS YELLOW STANDARD

## (undated) DEVICE — CATHETER KUMPE ACCESS 5.0FR .038 X 65CM(MIN ORDER 5)

## (undated) DEVICE — ***USE 86463***OMNIPAQUE 300MG PLMR BTL 150 ML NOVA+ 534Y

## (undated) DEVICE — SYRINGE DISP LUER-LOK 50 CC

## (undated) DEVICE — ADHESIVE, SKIN DERMABOND ADV .7 ML

## (undated) DEVICE — INTRODUCER FLEXOR RAABE 7FR 90CM